# Patient Record
Sex: MALE
[De-identification: names, ages, dates, MRNs, and addresses within clinical notes are randomized per-mention and may not be internally consistent; named-entity substitution may affect disease eponyms.]

---

## 2017-01-27 ENCOUNTER — HOSPITAL ENCOUNTER (OUTPATIENT)
Dept: HOSPITAL 53 - M PAIN | Age: 47
End: 2017-01-27
Attending: NURSE PRACTITIONER
Payer: MEDICARE

## 2017-01-27 DIAGNOSIS — M47.816: ICD-10-CM

## 2017-01-27 DIAGNOSIS — G47.30: ICD-10-CM

## 2017-01-27 DIAGNOSIS — M25.512: ICD-10-CM

## 2017-01-27 DIAGNOSIS — M47.817: ICD-10-CM

## 2017-01-27 DIAGNOSIS — K21.9: ICD-10-CM

## 2017-01-27 DIAGNOSIS — Z79.899: ICD-10-CM

## 2017-01-27 DIAGNOSIS — M25.562: ICD-10-CM

## 2017-01-27 DIAGNOSIS — J45.909: ICD-10-CM

## 2017-01-27 DIAGNOSIS — E11.9: ICD-10-CM

## 2017-01-27 DIAGNOSIS — G89.29: ICD-10-CM

## 2017-01-27 DIAGNOSIS — H91.90: ICD-10-CM

## 2017-01-27 DIAGNOSIS — Z79.891: ICD-10-CM

## 2017-01-27 DIAGNOSIS — M17.12: ICD-10-CM

## 2017-01-27 DIAGNOSIS — Z09: Primary | ICD-10-CM

## 2017-01-28 NOTE — ECWPNPC
PATIENT NAME: TOREY TRUJILLO

: 1970

GENDER: MALE

MRN: [id number]

VISIT DATE: 2017

DISCHARGE DATE: 17 0945

VISIT LOCKED DATE TIME: 

PHYSICIAN: KIM HOLLAND 

RESOURCE: KIM HOLLAND 

 

           

           

REASON FOR APPOINTMENT

           

          1. BACK, R SHOULDER

           

HISTORY OF PRESENT ILLNESS

           

      TODAY'S VISIT:

      NOTES:

          RATES PAIN TODAY AS 6/10. DESCRIBES PAIN AS CONSTANT, ACHING,

          TENDER AND THROBBING. IS NOTING INCREASED PAIN IN LOW BACK WITH

          RADIATION TO BUTTUCKS AND DOWN HIPS. SAW [facility] FOR

          LEFT KNEE WHO DID INJECTION AND HAS BEEN GIVEN A BRACE TO

          SEPARATE THE LEG AND KNEE STRUCTURES. THEIR THOUGHT IS EVENTUAL

          KNEE REPLACEMENT. HAS NOT YET HEARD ON REFERRAL FOR SHOULDER TO

          DR THOMPSON. HAVING CONTINUED PAIN IN LEFT SHOULDER JOINT AND

          DIFFICULTY WITH ROM. .

           

      HISTORY OF PRESENT ILLNESS:

      PAIN

           

           

          THE PATIENT DESCRIBES THE PAIN...

           

      FALL RISK SCREENING:

      SCREENING

           

           

          :NO FALLS IN THE PAST YEAR

           

CURRENT MEDICATIONS

           

          TAKING ALBUTEROL SULFATE 2 PUFFS INHALATION EVERY 4 HOURS AS

          NEEDED

          TAKING ZYRTEC 10 MG 1 TAB ORAL DAILY

          TAKING CYMBALTA 30 MG 1 CAPSULE ORALLY DAILY

          TAKING FISH  MG CAPSULE ORALLY BID

          TAKING LISINOPRIL 10 10 MG TABLET ORAL DAILY

          TAKING METFORMIN 1500 MG 1 TAB ORAL DAILY

          TAKING MONTELUKAST SODIUM 10 MG TABLET ORALLY ONCE A DAY

          TAKING MULTIVITAMIN 1 TABLET ORALLY DAILY

          TAKING SIMVASTATIN 40 40MG TABLET ORAL DAILY

          TAKING VITAMIN D 2000 UNIT TABLET ORALLY DAILY

          TAKING SOMA 250 MG TABLET 1 TABLET AS NEEDED ORALLY BID

          TAKING NORCO  MG TABLET 1 TABLET AS NEEDED ORALLY EVERY 6

          HRS PRN PAIN MDD=4

          TAKING NUCYNTA  MG TABLET EXTENDED RELEASE 12 HOUR ORALLY

          AT BEDTIME

          NOT-TAKING PREDNISONE 10 MG TABLET 5 TABLET ORALLY TAKE 5 TABS X

          2 DAYS, 4 TAB X 2 DAY, 3 TAB X 2 DAY, 2 TAB X 2 DAY AND 1 TAB X 2

          DAYS

          NOT-TAKING NEXIUM 20 MG 1 TAB ORAL DAILY

          NOT-TAKING DICLOFENAC SODIUM 75 MG TABLET DELAYED RELEASE 1

          TABLET ORALLY BID

          NOT-TAKING INDOMETHACIN 50 MG CAPSULE 1 CAPSULE WITH FOOD OR MILK

          ORALLY TWICE A DAY TAKE WITH FOOD

          NOT-TAKING AMITRIPTYLINE HCL 25 MG TABLET 1 TABLET AT BEDTIME

          ORALLY ONCE A DAY

          NOT-TAKING COMBI RX CREAM 1 DOSE AS DIRECTED

          NOT-TAKING ASCORBIC ACID 600 MG CAPSULE ORALLY DAILY

          MEDICATION LIST REVIEWED AND RECONCILED WITH THE PATIENT

           

PAST MEDICAL HISTORY

           

          ASTHMA

          SLEEP APNEA

          HEARING LOSS

          DIABETES

          REFLUX

          L KNEE ARTHRITIS

           

ALLERGIES

           

          ENVIRONMENTAL: CONGESTION: ALLERGY

           

SOCIAL HISTORY

           

          GENERAL:

           

          TOBACCO USE

          ARE YOU A:NONSMOKER

           

           

          LEARNING BARRIERS / SPECIAL NEEDS ORIENTED TO PLAN OF CARE:

          PATIENT, PAIN MANAGEMENT PATIENT, ORIENTED TO PLAN OF CARE:

          PATIENT, PAIN MANAGEMENT PATIENT.

           

           

          NEW PATIENT PAIN DIARY

          TODAY'S VISITNOTES

          FROM 0-10, WHAT LEVEL IS YOUR PAIN TODAY?0

           

           

          PAIN CLINIC PFS, CLERGY, PUBLIC HEALTH REFERRALS

          PFS REFERRAL NEEDED?NO

          CLERGY REFERRAL NEEDED?NO

          PUBLIC HEALTH REFERRAL NEEDED?NO

          WAS THE PROVIDER NOTIFIED OF ANY PERTINENT INFO?NO

          PFS REFERRAL NEEDED?NO

          CLERGY REFERRAL NEEDED?NO

          PUBLIC HEALTH REFERRAL NEEDED?NO

          WAS THE PROVIDER NOTIFIED OF ANY PERTINENT INFO?NO

           

REVIEW OF SYSTEMS

           

      CONSTITUTIONAL:

           

          ANY CHANGE IN YOUR MEDICAL CONDITION? NO . CHILLS NO . FEVER NO .

           

      INFECTION:

           

          DO YOU HAVE NEW INFECTIONS? NO . DO YOU HAVE HISTORY OF MRSA? NO

          .

           

      MUSCULOSKELETAL:

           

          ANY NEW PATTERNS OF PAIN OR NUMBNESS? NO .

           

      GASTROENTEROLOGY:

           

          GENERAL HAS LOST 17 LBS WITH DIET AND EXERCERISE. . ANY NEW

          CHANGE IN BOWEL CONTROL? NO . ABDOMINAL PAIN GI UPSET WITH

          METFORMIN. .

           

      GENITOURINARY:

           

          ANY NEW CHANGE IN BLADDER CONTROL? NO . IS THERE A CHANCE YOU

          COULD BE PREGNANT? NO .

           

      HEMATOLOGY/LYMPH:

           

          DO YOU TAKE ANY BLOOD THINNERS? (FOR EXAMPLE- COUMADIN, PLAVIX,

          AGGRENOX, PLATEL, PRADAXA, OR XARELTO) NO . WHEN WAS YOUR LAST

          DOSE? DATE: TIME: .

           

      NEUROLOGY:

           

          HAVE YOU FALLEN IN THE PAST 6 MONTHS? NO . ANY NEW EXTREMITY

          NUMBNESS OR WEAKNESS? NO .

           

      CARDIOLOGY:

           

          DO YOU HAVE A PACEMAKER OR DEFIBRILLATOR? NO .

           

      RESPIRATORY:

           

          HAVE YOU BEEN SICK IN THE PAST WEEK? NO . FEVER NO . FLU LIKE

          SYMPTOMS? NO . COUGH NO .

           

      INTEGUMENTARY:

           

          DO YOU HAVE ANY RASHES OR OPEN SORES? NO - NO RECENT OUTBREAKS OF

          HIVES. SEES ALLERGIST SOON. .

           

      ALLERGIC/IMMUNO:

           

          ARE YOU ALLERGIC TO SHELLFISH OR IV DYE? NO . ANY NEW ALLERGIES?

          NO .

           

      PSYCHIATRIC:

           

          DO YOU HAVE THOUGHTS OF HURTING YOURSELF OR SOMEONE ELSE? NO .

          ARE YOU ABUSED, NEGLECTED, OR IN AN UNSAFE ENVIRONMENT? NO .

           

      ENDOCRINOLOGY:

           

          ARE YOU DIABETIC? YES DIET CONTROLLED - STOPPED METFORMIN .

           

      OTHER:

           

          DO YOU NEED ANY PRESCRIPTIONS? YES NEXIUM, ? PREDNISONE . IF YES,

          PLEASE LIST: ____ . ANY NEW PROBLEMS WITH YOUR MEDICATIONS? NO .

          WHEN DID YOU LAST EAT? ____ . WHEN DID YOU LAST DRINK? ____ .

          WHAT DID YOU LAST DRINK? ____ . NAME OF PERSON DRIVING YOU HOME?

          ____ . DO YOU HAVE ANY OTHER QUESTIONS OR CONCERNS NO .

           

      REVIEWED BY:

           

          PROVIDER: KIM RACHEL .

           

VITAL SIGNS

           

           LBS, HT 71 IN, BMI 30.40 INDEX, /93 MM HG, HR 70

          /MIN, RR 16 /MIN, TEMP 97.3 F, OXYGEN SAT % 99, NA INITIALS TL

          0848, REVIEWED BY: MLF.

           

EXAMINATION

           

      GENERAL EXAMINATION:

          PSYCHALERT , ORIENTED X 3 , APPROPRIATE MOOD AND AFFECT .

           

          LUNGS:CLEAR TO AUSCULTATION BILATERALLY, NO WHEEZES, RALES AND

          RHONCHI.

           

          HEART:HEART RATE REGULAR.

           

          MUSCULOSKELETAL:POINT TENDERNESS OVER AR PARAVERTEBRAL MUSCLES

          AND INTO THE SACRUM. RESTRICTION OF ROM IN THIS AREA,IN

          OVERLUMBOSACRAL SPINE. POSITIVE FOR PAIN OVER LUMBAR PARSPINAL

          MUSCLES. POINT TENDERNESS OVER LEFT ANTERIOR AND POSTERIOR AC

          JOINT. MARKED REDUCTION OF RANGE OF MOTION TO LEFT SHOULDER WITH

          ABDUCTION..

           

          JOINTS:LEFT , KNEE , PAIN ALONG LATERAL ASPECT AND ALONG LEFT

          ILIOTIBIAL BAND..

           

ASSESSMENTS

           

          SPONDYLOSIS WITHOUT MYELOPATHY OR RADICULOPATHY, LUMBAR REGION -

          M47.816 (PRIMARY)

           

          SPONDYLOSIS WITHOUT MYELOPATHY OR RADICULOPATHY, LUMBOSACRAL

          REGION - M47.817

           

          LEFT SHOULDER PAIN, UNSPECIFIED CHRONICITY - M25.512

           

          LEFT LATERAL KNEE PAIN - M25.562

           

TREATMENT

           

      SPONDYLOSIS WITHOUT MYELOPATHY OR RADICULOPATHY,

          LUMBAR REGION

          INJECTION FACET JOINT/NERVE LUMBAR/SACRALKIM HOLLAND 2017

          9:25:28 AM > BILATERAL THERAPEUTIC LUMBAR AJAY HOLLANDKIM M

          2017 5:48:43 PM > L4-5 AND L5-S1

          NOTES: REFILL NEXIUM. CONTINUE CURRENT MEDS AND EXERCISE ROUTINE.

          APPOINTMENT OBTAINED WITH DR THOMPSON.,FACET JOINT INJECTION: YOUR

          EXPERIENCE MATERIAL WAS PRINTED,FACET JOINT INJECTION MATERIAL

          WAS PRINTED.

          CLINICAL NOTES: ISTOP REGISTRY REVIEWED. PT'S MEDS ARE ALL FILLED

          ON [facility] AND ARE NOT ON ISTOP. BRINGS IN MEDICATIONS WHICH IS

          APPROPRIATE FOR WHAT WAS DISPENSED. RECENT URINE TOXICOLOGY

          REVIEWED. NO UNAUTHORIZED MEDICATIONS. NO ILLICIT SUBSTANCES AND

          PRESCRIBED MEDICATIONS WERE PRESENT., FALLS CARE PLAN: 1.

          RECOMMEND REMOVING ALL THROW RUGS. 2. RECOMMEND NIGHT LIGHTS 3.

          RECOMMEND WEARING RUBBER SOLED SHOES AND TO NOT GO BAREFOOT. 4.

          ADVISED TO USE ASSISTIVE DEVICE (KNEE BRACE) SUCH AS CANE OR

          WALKER AS NEEDED.

           

PROCEDURE CODES

           

           ESTABILISHED PATIENT [facility] FACILITY CHARGE

           

           BP SCR PRFRM RCMDD DEFIND SCR INTVL

           

           PAIN ASSESS POS TOOL F/U PLAN DOC

           

          3016F PT SCRND UNHLTHY OH USE

           

          1124F ACP DISCUSS-NO DSCNMKR DOCD

           

          1036F TOBACCO NON-USER

           

          0518F FALL PLAN OF CARE DOCD

           

           DOC MEDS VERIFIED W/PT OR RE

           

           BMI >=30 CALCUATE W/FOLLOWUP

           

          3288F FALL RISK ASSESSMENT DOCD

           

FOLLOW UP

           

          AFTER INJECTION (REASON: CHECK AUTH FOR BILATERAL THERAPEUTIC

          LUMBAR FACET BLOCK)

           

 

ELECTRONICALLY SIGNED BY TUNDE MERINO ON

          2017 AT 06:03 PM EST

           

           

           

 

DISCLAIMER :

THIS IS A VISIT SUMMARY EXTRACTED FROM THE [employer] CHART.

IT IS NOT A COPY OF THE [employer] PROGRESS NOTE.

MTDD

## 2017-05-30 ENCOUNTER — HOSPITAL ENCOUNTER (OUTPATIENT)
Dept: HOSPITAL 53 - M PAIN | Age: 47
End: 2017-05-30
Attending: NURSE PRACTITIONER
Payer: MEDICARE

## 2017-05-30 DIAGNOSIS — J30.9: ICD-10-CM

## 2017-05-30 DIAGNOSIS — Z79.891: ICD-10-CM

## 2017-05-30 DIAGNOSIS — M47.817: ICD-10-CM

## 2017-05-30 DIAGNOSIS — Z79.899: ICD-10-CM

## 2017-05-30 DIAGNOSIS — M47.816: Primary | ICD-10-CM

## 2017-05-30 DIAGNOSIS — Z79.84: ICD-10-CM

## 2017-05-30 DIAGNOSIS — M25.512: ICD-10-CM

## 2017-05-30 DIAGNOSIS — M25.562: ICD-10-CM

## 2017-06-18 NOTE — ECWPNPC
PATIENT NAME: TOREY TRUJILLO

: 1970

GENDER: MALE

MRN: [id number]

VISIT DATE: 2017

DISCHARGE DATE: 17 1622

VISIT LOCKED DATE TIME: 

PHYSICIAN: KIM HOLLAND 

RESOURCE: KIM HOLLAND 

 

           

           

REASON FOR APPOINTMENT

           

          1. KNEE, BACK, ANKLE

           

HISTORY OF PRESENT ILLNESS

           

      HISTORY OF PRESENT ILLNESS:

      PAIN

           

           

          THE PATIENT DESCRIBES THE PAIN...

           

      FALL RISK SCREENING:

      SCREENING

           

           

          :NO FALLS IN THE PAST YEAR

           

      TODAY'S VISIT:

      NOTES:

          RATES PAIN LEVEL TODAY AS 7/10. DESCRIBES PAIN AS ACHING AND

          THROBBING. WORST AREA IS LOW BACK AND KNEES. ALSO IS IS HAVING

          PAIN IN NECK AND SHOULDERS. HAS BEEN TOLD HE IS IN NEED OF TOTAL

          KNEEREPLACEMENT IN LEFT KNEE WHICH WAS HIS "GOOD KNEE". KNEE

          "LOCKS" UP WITH PROLONGED SITTING. HAS NEW N/T IN LEFT LEG FROM

          HIP TO HEEP. .

           

CURRENT MEDICATIONS

           

          TAKING ALBUTEROL SULFATE 2 PUFFS INHALATION EVERY 4 HOURS AS

          NEEDED

          TAKING ZYRTEC 10 MG 1 TAB ORAL DAILY

          TAKING CYMBALTA 30 MG 1 CAPSULE ORALLY DAILY

          TAKING FISH  MG CAPSULE ORALLY BID

          TAKING LISINOPRIL 10 10 MG TABLET ORAL DAILY

          TAKING METFORMIN 1500 MG 1 TAB ORAL DAILY

          TAKING MONTELUKAST SODIUM 10 MG TABLET ORALLY ONCE A DAY

          TAKING MULTIVITAMIN 1 TABLET ORALLY DAILY

          TAKING SIMVASTATIN 40 40MG TABLET ORAL DAILY

          TAKING VITAMIN D 2000 UNIT TABLET ORALLY DAILY

          TAKING SOMA 250 MG TABLET 1 TABLET AS NEEDED ORALLY BID

          TAKING NORCO  MG TABLET 1 TABLET AS NEEDED ORALLY EVERY 6

          HRS PRN PAIN MDD=4

          TAKING NUCYNTA  MG TABLET EXTENDED RELEASE 12 HOUR ORALLY

          AT BEDTIME

          TAKING NEXIUM 20 MG CAPSULE DELAYED RELEASE 1 CAPSULE ORALLY ONCE

          A DAY

          NOT-TAKING PREDNISONE 10 MG TABLET 5 TABLET ORALLY TAKE 5 TABS X

          2 DAYS, 4 TAB X 2 DAY, 3 TAB X 2 DAY, 2 TAB X 2 DAY AND 1 TAB X 2

          DAYS

          NOT-TAKING NEXIUM 20 MG 1 TAB ORAL DAILY

          NOT-TAKING DICLOFENAC SODIUM 75 MG TABLET DELAYED RELEASE 1

          TABLET ORALLY BID

          NOT-TAKING INDOMETHACIN 50 MG CAPSULE 1 CAPSULE WITH FOOD OR MILK

          ORALLY TWICE A DAY TAKE WITH FOOD

          NOT-TAKING AMITRIPTYLINE HCL 25 MG TABLET 1 TABLET AT BEDTIME

          ORALLY ONCE A DAY

          NOT-TAKING COMBI RX CREAM 1 DOSE AS DIRECTED

          NOT-TAKING ASCORBIC ACID 600 MG CAPSULE ORALLY DAILY

          MEDICATION LIST REVIEWED AND RECONCILED WITH THE PATIENT

           

PAST MEDICAL HISTORY

           

          ASTHMA

          SLEEP APNEA

          HEARING LOSS

          DIABETES

          REFLUX

          L KNEE ARTHRITIS

           

ALLERGIES

           

          ENVIRONMENTAL: CONGESTION: ALLERGY

           

SOCIAL HISTORY

           

          GENERAL:

           

          TOBACCO USE

          ARE YOU A:NONSMOKER

           

           

          LEARNING BARRIERS / SPECIAL NEEDS ORIENTED TO PLAN OF CARE:

          PATIENT, PAIN MANAGEMENT PATIENT, ORIENTED TO PLAN OF CARE:

          PATIENT, PAIN MANAGEMENT PATIENT.

           

           

          NEW PATIENT PAIN DIARY

          TODAY'S VISITNOTES

          FROM 0-10, WHAT LEVEL IS YOUR PAIN TODAY?0

           

           

          PAIN CLINIC PFS, CLERGY, PUBLIC HEALTH REFERRALS

          PFS REFERRAL NEEDED?NO

          CLERGY REFERRAL NEEDED?NO

          PUBLIC HEALTH REFERRAL NEEDED?NO

          WAS THE PROVIDER NOTIFIED OF ANY PERTINENT INFO?NO

          PFS REFERRAL NEEDED?NO

          CLERGY REFERRAL NEEDED?NO

          PUBLIC HEALTH REFERRAL NEEDED?NO

          WAS THE PROVIDER NOTIFIED OF ANY PERTINENT INFO?NO

           

REVIEW OF SYSTEMS

           

      CONSTITUTIONAL:

           

          ANY CHANGE IN YOUR MEDICAL CONDITION? NO . CHILLS NO . FEVER NO .

           

      INFECTION:

           

          DO YOU HAVE NEW INFECTIONS? NO . DO YOU HAVE HISTORY OF MRSA? NO

          .

           

      MUSCULOSKELETAL:

           

          ANY NEW PATTERNS OF PAIN OR NUMBNESS? NO .

           

      GASTROENTEROLOGY:

           

          ANY NEW CHANGE IN BOWEL CONTROL? NO .

           

      GENITOURINARY:

           

          ANY NEW CHANGE IN BLADDER CONTROL? NO . IS THERE A CHANCE YOU

          COULD BE PREGNANT? NO .

           

      HEMATOLOGY/LYMPH:

           

          DO YOU TAKE ANY BLOOD THINNERS? (FOR EXAMPLE- COUMADIN, PLAVIX,

          AGGRENOX, PLATEL, PRADAXA, OR XARELTO) NO . WHEN WAS YOUR LAST

          DOSE? DATE: TIME: .

           

      NEUROLOGY:

           

          HAVE YOU FALLEN IN THE PAST 6 MONTHS? NO . ANY NEW EXTREMITY

          NUMBNESS OR WEAKNESS? NO .

           

      CARDIOLOGY:

           

          DO YOU HAVE A PACEMAKER OR DEFIBRILLATOR? NO .

           

      RESPIRATORY:

           

          HAVE YOU BEEN SICK IN THE PAST WEEK? NO . FEVER NO . FLU LIKE

          SYMPTOMS? NO . COUGH NO .

           

      INTEGUMENTARY:

           

          DO YOU HAVE ANY RASHES OR OPEN SORES? NO .

           

      ALLERGIC/IMMUNO:

           

          ARE YOU ALLERGIC TO SHELLFISH OR IV DYE? NO . ANY NEW ALLERGIES?

          NO .

           

      PSYCHIATRIC:

           

          DO YOU HAVE THOUGHTS OF HURTING YOURSELF OR SOMEONE ELSE? NO .

          ARE YOU ABUSED, NEGLECTED, OR IN AN UNSAFE ENVIRONMENT? NO .

           

      ENDOCRINOLOGY:

           

          ARE YOU DIABETIC? YES ON DIET CONTROL LAST AIC 4.6 .

           

      OTHER:

           

          DO YOU NEED ANY PRESCRIPTIONS? YES . IF YES, PLEASE LIST:

          CYMBALTA--GETS MEDS AT [facility] . ANY NEW PROBLEMS WITH YOUR

          MEDICATIONS? NO . WHEN DID YOU LAST EAT? ____ . WHEN DID YOU LAST

          DRINK? ____ . WHAT DID YOU LAST DRINK? ____ . NAME OF PERSON

          DRIVING YOU HOME? ____ . DO YOU HAVE ANY OTHER QUESTIONS OR

          CONCERNS NO .

           

      SKIN:

           

          PATIENT COMPLAINING OF RETURN OF HIVES RECENTLY - USING BENEDRYL

          .

           

      REVIEWED BY:

           

          PROVIDER: KIM RACHEL .

           

VITAL SIGNS

           

          .6 LBS, HT 71 IN, BMI 34.25 INDEX, /93 MM HG, HR 76

          /MIN, RR 16 /MIN, TEMP 98.0 F, OXYGEN SAT % 96%, NA INITIALS SC

          15:26, REVIEWED BY: AD.

           

EXAMINATION

           

      GENERAL EXAMINATION:

          PSYCHALERT , ORIENTED X 3 , APPROPRIATE MOOD AND AFFECT .

           

          LUNGS:CLEAR TO AUSCULTATION BILATERALLY, NO WHEEZES, RALES AND

          RHONCHI.

           

          HEART:HEART RATE REGULAR.

           

          MUSCULOSKELETAL:POINT TENDERNESS OVER AR PARAVERTEBRAL MUSCLES

          AND INTO THE SACRUM. RESTRICTION OF ROM IN THIS AREA,IN

          OVERLUMBOSACRAL SPINE. POSITIVE FOR PAIN OVER LUMBAR PARSPINAL

          MUSCLES. POINT TENDERNESS OVER LEFT ANTERIOR AND POSTERIOR AC

          JOINT. MARKED REDUCTION OF RANGE OF MOTION TO LEFT SHOULDER WITH

          ABDUCTION..

           

          JOINTS:LEFT , KNEE , PAIN ALONG LATERAL ASPECT AND ALONG LEFT

          ILIOTIBIAL BAND..

           

ASSESSMENTS

           

          SPONDYLOSIS WITHOUT MYELOPATHY OR RADICULOPATHY, LUMBAR REGION -

          M47.816 (PRIMARY)

           

          SPONDYLOSIS WITHOUT MYELOPATHY OR RADICULOPATHY, LUMBOSACRAL

          REGION - M47.817

           

          LEFT SHOULDER PAIN, UNSPECIFIED CHRONICITY - M25.512

           

          LEFT LATERAL KNEE PAIN - M25.562

           

TREATMENT

           

      SPONDYLOSIS WITHOUT MYELOPATHY OR RADICULOPATHY,

          LUMBAR REGION

          START DULOXETINE HCL CAPSULE DELAYED RELEASE PARTICLES, 30 MG, 1

          CAPSULE, ORALLY, DAILY, 30 DAY(S), 30 CAPSULE, REFILLS 5

          NOTES: REFER TO DR MALONE FOR MEDICAL MARIJUANA PROGRAM.

          CLINICAL NOTES: FALLS CARE PLAN: 1. RECOMMEND REMOVING ALL THROW

          RUGS. 2. RECOMMEND NIGHT LIGHTS 3. RECOMMEND WEARING RUBBER SOLED

          SHOES AND TO NOT GO BAREFOOT. 4.. ADVISED TO CHANGE POSITION

          SLOWLY FROM SUPINE TO STANDING TO AVOID DIZZINESS. 5. ADVISED TO

          USE ASSISTIVE DEVICE SUCH AS CANE WHEN NEEDED. USE KNEE SUPPORT

          AS NEEDED.

           

PROCEDURE CODES

           

           ESTABILISHED PATIENT [facility] FACILITY CHARGE

           

           BP SCR PRFRM RCMDD DEFIND SCR INTVL

           

           PAIN ASSESS POS TOOL F/U PLAN DOC

           

          3016F PT SCRND UNHLTHY OH USE

           

          1124F ACP DISCUSS-NO DSCNMKR DOCD

           

          1036F TOBACCO NON-USER

           

          0518F FALL PLAN OF CARE DOCD

           

           DOC MEDS VERIFIED W/PT OR RE

           

           BMI<30 AND >=22 CALC & DOCU

           

          3288F FALL RISK ASSESSMENT DOCD

           

DISPOSITION & COMMUNICATION

           

FOLLOW UP

           

          4-6 WEEKS (REASON: JOINT PAIN/KNEE PAIN)

           

 

ELECTRONICALLY SIGNED BY TUNDE MERINO ON

          2017 AT 01:34 PM EDT

           

           

           

 

DISCLAIMER :

THIS IS A VISIT SUMMARY EXTRACTED FROM THE [employer] CHART.

IT IS NOT A COPY OF THE [employer] PROGRESS NOTE.

MTDD

## 2018-07-23 ENCOUNTER — HOSPITAL ENCOUNTER (OUTPATIENT)
Dept: HOSPITAL 53 - M PAIN | Age: 48
End: 2018-07-23
Attending: NURSE PRACTITIONER
Payer: MEDICARE

## 2018-07-23 DIAGNOSIS — J45.909: ICD-10-CM

## 2018-07-23 DIAGNOSIS — M50.93: ICD-10-CM

## 2018-07-23 DIAGNOSIS — M17.12: ICD-10-CM

## 2018-07-23 DIAGNOSIS — M54.16: ICD-10-CM

## 2018-07-23 DIAGNOSIS — M79.1: Primary | ICD-10-CM

## 2018-07-23 DIAGNOSIS — G47.30: ICD-10-CM

## 2018-07-23 DIAGNOSIS — E11.9: ICD-10-CM

## 2018-07-23 DIAGNOSIS — Z79.899: ICD-10-CM

## 2018-08-14 ENCOUNTER — HOSPITAL ENCOUNTER (OUTPATIENT)
Dept: HOSPITAL 53 - M PAIN | Age: 48
End: 2018-08-14
Attending: ANESTHESIOLOGY
Payer: MEDICARE

## 2018-08-14 DIAGNOSIS — Z79.899: ICD-10-CM

## 2018-08-14 DIAGNOSIS — E11.9: ICD-10-CM

## 2018-08-14 DIAGNOSIS — M17.12: ICD-10-CM

## 2018-08-14 DIAGNOSIS — G47.30: ICD-10-CM

## 2018-08-14 DIAGNOSIS — J45.909: ICD-10-CM

## 2018-08-14 DIAGNOSIS — M54.6: ICD-10-CM

## 2018-08-14 DIAGNOSIS — M54.5: ICD-10-CM

## 2018-08-14 DIAGNOSIS — M79.1: ICD-10-CM

## 2018-08-14 DIAGNOSIS — G89.29: Primary | ICD-10-CM

## 2018-08-23 ENCOUNTER — HOSPITAL ENCOUNTER (OUTPATIENT)
Dept: HOSPITAL 53 - M PAIN | Age: 48
End: 2018-08-23
Attending: NURSE PRACTITIONER
Payer: MEDICARE

## 2018-08-23 DIAGNOSIS — G47.30: ICD-10-CM

## 2018-08-23 DIAGNOSIS — M50.93: ICD-10-CM

## 2018-08-23 DIAGNOSIS — J45.909: ICD-10-CM

## 2018-08-23 DIAGNOSIS — M17.12: ICD-10-CM

## 2018-08-23 DIAGNOSIS — M79.1: Primary | ICD-10-CM

## 2018-08-23 DIAGNOSIS — E11.9: ICD-10-CM

## 2018-08-23 DIAGNOSIS — K21.9: ICD-10-CM

## 2018-08-23 DIAGNOSIS — M54.16: ICD-10-CM

## 2018-08-23 DIAGNOSIS — Z79.899: ICD-10-CM

## 2018-09-17 ENCOUNTER — HOSPITAL ENCOUNTER (OUTPATIENT)
Dept: HOSPITAL 53 - M PAIN | Age: 48
End: 2018-09-17
Attending: ANESTHESIOLOGY
Payer: MEDICARE

## 2018-09-17 DIAGNOSIS — J45.909: ICD-10-CM

## 2018-09-17 DIAGNOSIS — M79.1: Primary | ICD-10-CM

## 2018-09-17 DIAGNOSIS — M17.12: ICD-10-CM

## 2018-09-17 DIAGNOSIS — Z79.899: ICD-10-CM

## 2018-09-17 DIAGNOSIS — E11.9: ICD-10-CM

## 2018-09-17 DIAGNOSIS — G47.30: ICD-10-CM

## 2018-09-17 DIAGNOSIS — K21.9: ICD-10-CM

## 2018-10-15 ENCOUNTER — HOSPITAL ENCOUNTER (OUTPATIENT)
Dept: HOSPITAL 53 - M PAIN | Age: 48
End: 2018-10-15
Attending: NURSE PRACTITIONER
Payer: MEDICARE

## 2018-10-15 DIAGNOSIS — M17.12: ICD-10-CM

## 2018-10-15 DIAGNOSIS — E11.9: ICD-10-CM

## 2018-10-15 DIAGNOSIS — M54.16: ICD-10-CM

## 2018-10-15 DIAGNOSIS — J45.909: ICD-10-CM

## 2018-10-15 DIAGNOSIS — Z79.899: ICD-10-CM

## 2018-10-15 DIAGNOSIS — G47.30: ICD-10-CM

## 2018-10-15 DIAGNOSIS — M79.7: Primary | ICD-10-CM

## 2018-10-15 DIAGNOSIS — M50.93: ICD-10-CM

## 2018-10-17 ENCOUNTER — HOSPITAL ENCOUNTER (OUTPATIENT)
Dept: HOSPITAL 53 - M PAIN | Age: 48
End: 2018-10-17
Attending: ANESTHESIOLOGY
Payer: MEDICARE

## 2018-10-17 DIAGNOSIS — G47.30: ICD-10-CM

## 2018-10-17 DIAGNOSIS — K21.9: ICD-10-CM

## 2018-10-17 DIAGNOSIS — J45.909: ICD-10-CM

## 2018-10-17 DIAGNOSIS — M17.12: ICD-10-CM

## 2018-10-17 DIAGNOSIS — M79.18: Primary | ICD-10-CM

## 2018-10-17 DIAGNOSIS — Z79.899: ICD-10-CM

## 2018-10-17 DIAGNOSIS — M54.5: ICD-10-CM

## 2018-10-17 DIAGNOSIS — E11.9: ICD-10-CM

## 2018-10-31 ENCOUNTER — HOSPITAL ENCOUNTER (OUTPATIENT)
Dept: HOSPITAL 53 - M PAIN | Age: 48
End: 2018-10-31
Attending: NURSE PRACTITIONER
Payer: MEDICARE

## 2018-10-31 DIAGNOSIS — M79.7: Primary | ICD-10-CM

## 2018-10-31 DIAGNOSIS — E11.9: ICD-10-CM

## 2018-10-31 DIAGNOSIS — Z79.899: ICD-10-CM

## 2018-10-31 DIAGNOSIS — J45.909: ICD-10-CM

## 2018-10-31 DIAGNOSIS — M50.93: ICD-10-CM

## 2018-10-31 DIAGNOSIS — M17.12: ICD-10-CM

## 2018-10-31 DIAGNOSIS — M54.16: ICD-10-CM

## 2018-10-31 DIAGNOSIS — G47.30: ICD-10-CM

## 2018-10-31 DIAGNOSIS — Z91.09: ICD-10-CM

## 2018-12-27 ENCOUNTER — HOSPITAL ENCOUNTER (OUTPATIENT)
Dept: HOSPITAL 53 - M PAIN | Age: 48
End: 2018-12-27
Attending: NURSE PRACTITIONER
Payer: MEDICARE

## 2018-12-27 DIAGNOSIS — Z79.891: ICD-10-CM

## 2018-12-27 DIAGNOSIS — Z79.899: ICD-10-CM

## 2018-12-27 DIAGNOSIS — M79.18: Primary | ICD-10-CM

## 2018-12-27 DIAGNOSIS — M79.7: ICD-10-CM

## 2018-12-27 DIAGNOSIS — J30.2: ICD-10-CM

## 2018-12-27 DIAGNOSIS — Z79.82: ICD-10-CM

## 2019-01-17 ENCOUNTER — HOSPITAL ENCOUNTER (OUTPATIENT)
Dept: HOSPITAL 53 - M PAIN | Age: 49
End: 2019-01-17
Attending: ANESTHESIOLOGY
Payer: MEDICARE

## 2019-01-17 DIAGNOSIS — J45.909: ICD-10-CM

## 2019-01-17 DIAGNOSIS — Z79.899: ICD-10-CM

## 2019-01-17 DIAGNOSIS — E11.9: ICD-10-CM

## 2019-01-17 DIAGNOSIS — M79.18: Primary | ICD-10-CM

## 2019-01-17 DIAGNOSIS — G47.30: ICD-10-CM

## 2019-01-17 DIAGNOSIS — M54.5: ICD-10-CM

## 2019-01-17 DIAGNOSIS — M17.12: ICD-10-CM

## 2019-01-17 PROCEDURE — 20552 NJX 1/MLT TRIGGER POINT 1/2: CPT

## 2019-01-21 NOTE — ECWPNPC
PATIENT NAME: TOREY TRUJILLO

: 1970

GENDER: MALE

MRN: [id number]

VISIT DATE: 2018

DISCHARGE DATE: 18 1405

VISIT LOCKED DATE TIME: 

PHYSICIAN: JELANI TRAN

RESOURCE: JELANI TRAN

 

           

           

REASON FOR APPOINTMENT

           

          1. 8 WEEKS

           

HISTORY OF PRESENT ILLNESS

           

      HISTORY OF PRESENT ILLNESS:  HERE FOR F/U AND

          MANAGEMENT FOR CHRONIC GENERALIZED BACK PAIN.CHIEF AREA OF PAIN

          IS MID AND LOW BACK.HAS RESPONDED WELL TO TPI IN THE PAST.RATING

          PAIN VAS 6/10.FINDS CURRENT CHRONIC PAIN MEDICINE EFFECTIVE AT

          REDUCING PAIN.DENIES SIDE EFFECTS.

      PAIN

           

           

          THE PATIENT DESCRIBES THE PAIN...

           

      FALL RISK SCREENING:

      SCREENING

           

           

          :NO FALLS IN THE PAST YEAR

           

CURRENT MEDICATIONS

           

          TAKING ALBUTEROL SULFATE 2 PUFFS INHALATION EVERY 4 HOURS AS

          NEEDED

          TAKING MULTIVITAMIN 1 TABLET ORALLY DAILY

          TAKING ASCORBIC ACID 600 MG CAPSULE ORALLY DAILY

          TAKING MAY HAVE - - CBD OIL PO AS DIRECTED

          TAKING LISINOPRIL 10 10 MG TABLET ORAL DAILY

          TAKING FISH  MG CAPSULE ORALLY BID

          TAKING VITAMIN D 2000 UNIT TABLET ORALLY DAILY

          TAKING LIDODERM 5 % PATCH 1-2 PATCH TO SKIN REMOVE AFTER 12 HOURS

          EXTERNALLY TO PAIN FULAREAS OF BACK, KNEE/SHOULDER AS NEEDED ON

          12, OFF 12

          TAKING HYDROCODONE-ACETAMINOPHEN  MG TABLET 1 TABLET AS

          NEEDED ORALLY BID PRN PAIN MDD=2

          TAKING BACLOFEN 10 MG TABLET 1 TABLET WITH FOOD OR MILK ORALLY

          THREE TIMES A DAY

          TAKING INDOMETHACIN 50 MG CAPSULE 1 CAPSULE WITH FOOD OR MILK

          ORALLY TWICE A DAY

          NOT-TAKING INDOMETHACIN 50 MG CAPSULE 1 CAPSULE WITH FOOD OR MILK

          ORALLY TWICE A DAY TAKE WITH FOOD

          NOT-TAKING SIMVASTATIN 40 40MG TABLET ORAL DAILY

          NOT-TAKING NEXIUM 20 MG CAPSULE DELAYED RELEASE 1 CAPSULE ORALLY

          ONCE A DAY

          NOT-TAKING OMEPRAZOLE 20 MG CAPSULE DELAYED RELEASE 1 CAPSULE

          ORALLY ONCE A DAY

          NOT-TAKING METFORMIN 1500 MG 1 TAB ORAL DAILY

          NOT-TAKING CYMBALTA 30 MG 1 CAPSULE ORALLY DAILY

          NOT-TAKING DULOXETINE HCL 30 MG CAPSULE DELAYED RELEASE PARTICLES

          1 CAPSULE ORALLY DAILY

          NOT-TAKING ZYRTEC 10 MG 1 TAB ORAL DAILY

          NOT-TAKING MONTELUKAST SODIUM 10 MG TABLET ORALLY ONCE A DAY

          NOT-TAKING SOMA 250 MG TABLET 1 TABLET AS NEEDED ORALLY BID

          NOT-TAKING NORCO  MG TABLET 1 TABLET AS NEEDED ORALLY EVERY

          6 HRS PRN PAIN MDD=4

          NOT-TAKING NUCYNTA  MG TABLET EXTENDED RELEASE 12 HOUR

          ORALLY AT BEDTIME

          NOT-TAKING LIDODERM 5 % PATCH 1 PATCH TO SKIN REMOVE AFTER 12

          HOURS EXTERNALLY APPLY TO LOW BACK AND KNEE ONCE A DAY ON 12 HRS

          OFF 12 HOURS

          NOT-TAKING DICLOFENAC SODIUM 75 MG TABLET DELAYED RELEASE 1

          TABLET ORALLY BID

          NOT-TAKING PREDNISONE 10 MG TABLET 5 TABLET ORALLY TAKE 5 TABS X

          2 DAYS, 4 TAB X 2 DAY, 3 TAB X 2 DAY, 2 TAB X 2 DAY AND 1 TAB X 2

          DAYS

          NOT-TAKING NEXIUM 20 MG 1 TAB ORAL DAILY

          NOT-TAKING AMITRIPTYLINE HCL 25 MG TABLET 1 TABLET AT BEDTIME

          ORALLY ONCE A DAY

          NOT-TAKING COMBI RX CREAM 1 DOSE AS DIRECTED

          MEDICATION LIST REVIEWED AND RECONCILED WITH THE PATIENT

           

PAST MEDICAL HISTORY

           

          ASTHMA

          SLEEP APNEA, USES CPAP

          HEARING LOSS

          DIABETES

          REFLUX

          L KNEE ARTHRITIS

           

ALLERGIES

           

          ENVIRONMENTAL: CONGESTION: ALLERGY

           

SURGICAL HISTORY

           

          RIGHT SHOULDER RECONSTRUCTION/ ROTATOR CUFF REPAIR 

          LEFT ANKLE RECONSTRUCTION 

          RIGHT KNEE MENISCUS REPAIR 

          UP3 ( ORAL SURGERY) 

          RIGHT SHOULDER REPAIR 

           

FAMILY HISTORY

           

          FATHER:  68 YRS, DIAGNOSED WITH HEART DISEASE

          MOTHER: ALIVE 64 YRS

          3 SISTER(S) - HEALTHY. 2 SON(S) - HEALTHY.

           

SOCIAL HISTORY

           

          GENERAL:

           

          TOBACCO USE

          ARE YOU A:NONSMOKER

           

           

          ALCOHOL SCREENING

          DID YOU HAVE A DRINK CONTAINING ALCOHOL IN THE PAST YEAR?YES

          POINTS1

          INTERPRETATIONNEGATIVE

          HOW OFTEN DID YOU HAVE A DRINK CONTAINING ALCOHOL IN THE PAST

          YEAR?MONTHLY OR LESS (1 POINT)

           

           

          Restorationist

          [id number] NONE

           

           

          LEARNING BARRIERS / SPECIAL NEEDS

          BARRIERS TO LEARNING?NO

          HEARING IMPAIRED?NO

          VISION IMPAIRED?YES

          :CORRECTIVE LENSES READING

          COGNITIVELY IMPAIRED?NO

          READINESS TO LEARN?YES

          LEARNING PREFERENCES?NO

          LEARNING CAPABILITIES PRESENT?YES

          EMOTIONAL BARRIERS?NO

          SPECIAL DEVICES?YES

          :CANE, OTHER KNEE AND ANKLE BRACE

           NEEDED?NO

           

           

          DIET: CARBOHYDRATE CONTROLLED.

           

           

          MARITAL STATUS: .

           

           

          OTHERS AT HOME: CHILD 2 BOYS.

           

           

          NEW PATIENT PAIN DIARY  TODAY'S VISIT NOTES, FROM 0-10, WHAT

          LEVEL IS YOUR PAIN TODAY? 0.

           

           

          PAIN CLINIC PFS, CLERGY, PUBLIC HEALTH REFERRALS

          PFS REFERRAL NEEDED?NO

          CLERGY REFERRAL NEEDED?NO

          PUBLIC HEALTH REFERRAL NEEDED?NO

          WAS THE PROVIDER NOTIFIED OF ANY PERTINENT INFO?YES

          HAS THE PATIENT BEEN EDUCATED REGARDING HIS/HER PLAN OF CARE?YES

          HAS THE PATIENT BEEN EDUCATED REGARDING PAIN, THE RISK FOR PAIN,

          THE IMPORTANCE OF EFFECTIVE PAIN MANAGEMENT, AND THE PAIN

          ASSESSMENT PROCESS?YES

           

           

          ADVANCE DIRECTIVE

          ADVANCE DIRECTIVE DISCUSSED WITH PATIENT:YES PT HCP ALVARO TRUJILLO [phone]

           

           

          REVIEWED WITH PT 18 1325 LAS.

           

HOSPITALIZATION/MAJOR DIAGNOSTIC PROCEDURE

           

          [facility] 2013

           

REVIEW OF SYSTEMS

           

      REVIEWED BY:

           

          PROVIDER:    EJLANI RACHEL .

           

      CONSTITUTIONAL:

           

          ANY CHANGE IN YOUR MEDICAL CONDITION?    NO . CHILLS    NO .

          FEVER    NO .

           

      INFECTION:

           

          DO YOU HAVE NEW INFECTIONS?    NO . DO YOU HAVE HISTORY OF MRSA? 

            NO .

           

      MUSCULOSKELETAL:

           

          ANY NEW PATTERNS OF PAIN OR NUMBNESS?    NO .

           

      GASTROENTEROLOGY:

           

          ANY NEW CHANGE IN BOWEL CONTROL?    NO .

           

      GENITOURINARY:

           

          ANY NEW CHANGE IN BLADDER CONTROL?    NO . IS THERE A CHANCE YOU

          COULD BE PREGNANT?    NO .

           

      HEMATOLOGY/LYMPH:

           

          DO YOU TAKE ANY BLOOD THINNERS? (FOR EXAMPLE- COUMADIN, PLAVIX,

          AGGRENOX, PLATEL, PRADAXA, OR XARELTO)    NO . WHEN WAS YOUR LAST

          DOSE?    DATE: TIME:  .

           

      NEUROLOGY:

           

          HAVE YOU FALLEN IN THE PAST 6 MONTHS?    NO . ANY NEW EXTREMITY

          NUMBNESS OR WEAKNESS?    NO .

           

      CARDIOLOGY:

           

          DO YOU HAVE A PACEMAKER OR DEFIBRILLATOR?    NO .

           

      RESPIRATORY:

           

          HAVE YOU BEEN SICK IN THE PAST WEEK?    NO . FEVER    NO . FLU

          LIKE SYMPTOMS?    NO . COUGH    NO .

           

      INTEGUMENTARY:

           

          DO YOU HAVE ANY RASHES OR OPEN SORES?    NO .

           

      ALLERGIC/IMMUNO:

           

          ARE YOU ALLERGIC TO SHELLFISH OR IV DYE?    NO . ANY NEW

          ALLERGIES?    NO .

           

      PSYCHIATRIC:

           

          DO YOU HAVE THOUGHTS OF HURTING YOURSELF OR SOMEONE ELSE?    NO .

          ARE YOU ABUSED, NEGLECTED, OR IN AN UNSAFE ENVIRONMENT?    NO .

           

      ENDOCRINOLOGY:

           

          ARE YOU DIABETIC?    YES .

           

      OTHER:

           

          DO YOU NEED ANY PRESCRIPTIONS?    YES . IF YES, PLEASE LIST:   

          ____HYDROCODONE, LIDODERM PATCHES . ANY NEW PROBLEMS WITH YOUR

          MEDICATIONS?    NO . WHEN DID YOU LAST EAT?    ____ . WHEN DID

          YOU LAST DRINK?    ____ . WHAT DID YOU LAST DRINK?    ____ . NAME

          OF PERSON DRIVING YOU HOME?    ____ . DO YOU HAVE ANY OTHER

          QUESTIONS OR CONCERNS    NO .

           

VITAL SIGNS

           

          .6 LBS, HT 71 IN, BMI 30.49 INDEX, /74 MM HG, HR 76

          /MIN, RR 16 /MIN, TEMP 97.3 F, OXYGEN SAT % 99%, SAFE IN ENV?

          (Y/N) YES, NA INITIALS SC 13:13, REVIEWED BY: QUIN.

           

EXAMINATION

           

      GENERAL EXAMINATION:

          GENERAL APPEARANCE:AWAKE,ALERT ,PLEAASANT .

           

          PSYCHAFFECT NORMAL .

           

          LUNGS:LUNG TEJEDA ARE CLEAR TO AUSCULTATION BILATERALLY. GOOD

          MOVEMENT OF AIR .

           

          HEART:S1, S2 IN A REGULAR RATE AND RHYTHM. NO SIGNIFICANT

          MURMURS, RUBS OR GALLOPS NOTED .

           

          MUSCULOSKELETAL:MUSCLE STRENGTH TESTING 5/5 BILATERAL LOWER

          EXTREMITIES .

           

          LUMBAR SACRAL SPINETRIGGER POINTS:, ELICITED WITH PALPATION OVER

          LUMBAR PARAVERTEBRAL MUSCLES AND THORACIC PARASPINALSRESTRICTION

          OF ROM IN THIS AREA .

           

ASSESSMENTS

           

          MYALGIA, OTHER SITE - M79.18 (PRIMARY)

           

          FIBROMYALGIA - M79.7

           

TREATMENT

           

      MYALGIA, OTHER SITE

          CONTINUE HYDROCODONE-ACETAMINOPHEN TABLET,  MG, 1 TABLET AS

          NEEDED, ORALLY, BID PRN PAIN MDD=2

          STOP INDOMETHACIN CAPSULE, 50 MG, 1 CAPSULE WITH FOOD OR MILK,

          ORALLY, TWICE A DAY

          CONTINUE BACLOFEN TABLET, 10 MG, 1 TABLET WITH FOOD OR MILK,

          ORALLY, THREE TIMES A DAY

          START ARTHROTEC TABLET DELAYED RELEASE, 75-0.2 MG, 1 TABLET WITH

          FOOD, ORALLY, TWICE A DAY, 30 DAY(S), 60, REFILLS 2

          REFILL LIDODERM PATCH, 5 %, 1 PATCH TO SKIN REMOVE AFTER 12

          HOURS, EXTERNALLY, APPLY TO LOW BACK AND KNEE ONCE A DAY ON 12

          HRS OFF 12 HOURS, 30 DAY(S), 60, REFILLS 2

          NOTES: TPI THORACIC /LOW BACK, ISTOP REGISTRY REVIEWED AND

          DEMONSTRATES COMPLLIANCE. (REF #[id number] ) BRINGS IN MEDICATIONS

          WHICH IS APPROPRIATE FOR WHAT WAS DISPENSED. RECENT URINE

          TOXICOLOGY REVIEWED. NO UNAUTHORIZED MEDICATIONS. NO ILLICIT

          SUBSTANCES AND PRESCRIBED MEDICATIONS WERE PRESENT. , RISKS AND

          BENEFITS OF NARCOTIC/OPIOD MEDICATIONS WERE REVIEWED WITH PATIENT

          - THIS INCLUDES BUT IS NOT LIMITED TO RISK OF

          DEPENDANCE/DEVELOPMENT OF ADDICTION, MOOD DISTURBANCE AND

          DEPRESSION, OSTEOPOROSIS, HORMONAL AND LABIDAL CHANGES,

          RESPIRATORY DEPRESSION AND DEATH. PATIENT IS ADVISED NOT TO DRIVE

          OR DRINK ALCOHOL WHILE ON THESE MEDICATIONS.

           

PROCEDURE CODES

           

           ESTABILISHED PATIENT [facility] CHARGE

           

DISPOSITION & COMMUNICATION

           

FOLLOW UP

           

          POST (REASON: TPI THORACIC /LOW BACK)

           

 

ELECTRONICALLY SIGNED BY VIRGINIE SMITH ON

          2019 AT 04:57 PM EST

           

           

           

 

DISCLAIMER :

THIS IS A VISIT SUMMARY EXTRACTED FROM THE [employer] CHART.

IT IS NOT A COPY OF THE [employer] PROGRESS NOTE.

BRID

## 2019-01-25 ENCOUNTER — HOSPITAL ENCOUNTER (EMERGENCY)
Dept: HOSPITAL 53 - M ED | Age: 49
Discharge: HOME | End: 2019-01-25
Payer: MEDICARE

## 2019-01-25 VITALS
BODY MASS INDEX: 27.82 KG/M2 | SYSTOLIC BLOOD PRESSURE: 138 MMHG | WEIGHT: 205.43 LBS | DIASTOLIC BLOOD PRESSURE: 93 MMHG | HEIGHT: 72 IN

## 2019-01-25 DIAGNOSIS — Z79.899: ICD-10-CM

## 2019-01-25 DIAGNOSIS — J45.909: ICD-10-CM

## 2019-01-25 DIAGNOSIS — F43.10: ICD-10-CM

## 2019-01-25 DIAGNOSIS — L50.9: Primary | ICD-10-CM

## 2019-01-25 DIAGNOSIS — G47.33: ICD-10-CM

## 2019-01-25 DIAGNOSIS — E11.9: ICD-10-CM

## 2019-01-25 PROCEDURE — 99282 EMERGENCY DEPT VISIT SF MDM: CPT

## 2019-01-25 PROCEDURE — 96372 THER/PROPH/DIAG INJ SC/IM: CPT

## 2019-02-01 ENCOUNTER — HOSPITAL ENCOUNTER (OUTPATIENT)
Dept: HOSPITAL 53 - M PAIN | Age: 49
End: 2019-02-01
Attending: NURSE PRACTITIONER
Payer: MEDICARE

## 2019-02-01 DIAGNOSIS — M79.7: ICD-10-CM

## 2019-02-01 DIAGNOSIS — Z96.652: ICD-10-CM

## 2019-02-01 DIAGNOSIS — E11.9: ICD-10-CM

## 2019-02-01 DIAGNOSIS — G47.30: ICD-10-CM

## 2019-02-01 DIAGNOSIS — Z79.899: ICD-10-CM

## 2019-02-01 DIAGNOSIS — M79.18: Primary | ICD-10-CM

## 2019-02-01 DIAGNOSIS — J45.909: ICD-10-CM

## 2019-02-04 NOTE — ECWPNPC
PATIENT NAME: TOREY TRUJILLO

: 1970

GENDER: MALE

MRN: [id number]

VISIT DATE: 2019

DISCHARGE DATE: 1949

VISIT LOCKED DATE TIME: 

PHYSICIAN: ALEKSANDAR WINSLOW MD

RESOURCE: ALEKSANDAR WINSLOW MD

 

           

           

REASON FOR APPOINTMENT

           

          1. TPI

           

HISTORY OF PRESENT ILLNESS

           

      HISTORY OF PRESENT ILLNESS:

      PAIN

           

           

          THE PATIENT DESCRIBES THE PAIN...

           

      FALL RISK SCREENING:

      SCREENING

           

           

          :NO FALLS IN THE PAST YEAR

           

CURRENT MEDICATIONS

           

          TAKING ALBUTEROL SULFATE 2 PUFFS INHALATION EVERY 4 HOURS AS

          NEEDED, NOTES: 

          TAKING MULTIVITAMIN 1 TABLET ORALLY DAILY, NOTES: 600

          TAKING ASCORBIC ACID 600 MG CAPSULE ORALLY DAILY, NOTES: 600

          TAKING MAY HAVE - - CBD OIL PO AS DIRECTED, NOTES: 

          TAKING FISH  MG CAPSULE ORALLY BID, NOTES: 600

          TAKING VITAMIN D 2000 UNIT TABLET ORALLY DAILY, NOTES: 600

          TAKING LIDODERM 5 % PATCH 1-2 PATCH TO SKIN REMOVE AFTER 12 HOURS

          EXTERNALLY TO PAIN FULAREAS OF BACK, KNEE/SHOULDER AS NEEDED ON

          12, OFF 12, NOTES: 

          TAKING HYDROCODONE-ACETAMINOPHEN  MG TABLET 1 TABLET AS

          NEEDED ORALLY BID PRN PAIN MDD=2, NOTES: 

          TAKING BACLOFEN 10 MG TABLET 1 TABLET WITH FOOD OR MILK ORALLY

          THREE TIMES A DAY, NOTES: 

          TAKING ARTHROTEC 75-0.2 MG TABLET DELAYED RELEASE 1 TABLET WITH

          FOOD ORALLY TWICE A DAY, NOTES: 2200

          TAKING ZYRTEC 10 MG 1 TAB ORAL DAILY, NOTES: 2200

          NOT-TAKING LISINOPRIL 10 10 MG TABLET ORAL DAILY

          DISCONTINUED LIDODERM 5 % PATCH 1 PATCH TO SKIN REMOVE AFTER 12

          HOURS EXTERNALLY APPLY TO LOW BACK AND KNEE ONCE A DAY ON 12 HRS

          OFF 12 HOURS, NOTES: DUPLICATE

          DISCONTINUED INDOMETHACIN 50 MG CAPSULE 1 CAPSULE WITH FOOD OR

          MILK ORALLY TWICE A DAY TAKE WITH FOOD

          DISCONTINUED SIMVASTATIN 40 40MG TABLET ORAL DAILY

          DISCONTINUED NEXIUM 20 MG CAPSULE DELAYED RELEASE 1 CAPSULE

          ORALLY ONCE A DAY

          DISCONTINUED OMEPRAZOLE 20 MG CAPSULE DELAYED RELEASE 1 CAPSULE

          ORALLY ONCE A DAY

          DISCONTINUED METFORMIN 1500 MG 1 TAB ORAL DAILY

          DISCONTINUED CYMBALTA 30 MG 1 CAPSULE ORALLY DAILY

          DISCONTINUED DULOXETINE HCL 30 MG CAPSULE DELAYED RELEASE

          PARTICLES 1 CAPSULE ORALLY DAILY

          DISCONTINUED MONTELUKAST SODIUM 10 MG TABLET ORALLY ONCE A DAY

          DISCONTINUED SOMA 250 MG TABLET 1 TABLET AS NEEDED ORALLY BID

          DISCONTINUED NORCO  MG TABLET 1 TABLET AS NEEDED ORALLY

          EVERY 6 HRS PRN PAIN MDD=4

          DISCONTINUED NUCYNTA  MG TABLET EXTENDED RELEASE 12 HOUR

          ORALLY AT BEDTIME

          DISCONTINUED DICLOFENAC SODIUM 75 MG TABLET DELAYED RELEASE 1

          TABLET ORALLY BID, NOTES: DUPLICATE

          DISCONTINUED PREDNISONE 10 MG TABLET 5 TABLET ORALLY TAKE 5 TABS

          X 2 DAYS, 4 TAB X 2 DAY, 3 TAB X 2 DAY, 2 TAB X 2 DAY AND 1 TAB X

          2 DAYS

          DISCONTINUED NEXIUM 20 MG 1 TAB ORAL DAILY

          DISCONTINUED AMITRIPTYLINE HCL 25 MG TABLET 1 TABLET AT BEDTIME

          ORALLY ONCE A DAY

          DISCONTINUED COMBI RX CREAM 1 DOSE AS DIRECTED

          MEDICATION LIST REVIEWED AND RECONCILED WITH THE PATIENT

           

PAST MEDICAL HISTORY

           

          ASTHMA

          SLEEP APNEA, USES CPAP

          HEARING LOSS

          DIABETES

          REFLUX

          L KNEE ARTHRITIS

          BACK PAIN

          RIGHT KNEE PAIN

          LEFT ANKLE PAIN

          RIGHT SHOULDER PAIN

          FIBROMYALGIA

           

ALLERGIES

           

          ENVIRONMENTAL: CONGESTION: ALLERGY

           

SURGICAL HISTORY

           

          RIGHT SHOULDER RECONSTRUCTION/ ROTATOR CUFF REPAIR 

          LEFT ANKLE RECONSTRUCTION 

          RIGHT KNEE MENISCUS REPAIR 

          UP3 ( ORAL SURGERY) 

          RIGHT SHOULDER REPAIR 

           

FAMILY HISTORY

           

          FATHER:  68 YRS, DIAGNOSED WITH HEART DISEASE

          MOTHER: ALIVE 64 YRS

          3 SISTER(S) - HEALTHY. 2 SON(S) - HEALTHY.

           

SOCIAL HISTORY

           

          GENERAL:

           

          TOBACCO USE

          ARE YOU A:NONSMOKER

           

           

          ALCOHOL SCREENING

          DID YOU HAVE A DRINK CONTAINING ALCOHOL IN THE PAST YEAR?YES

          POINTS1

          INTERPRETATIONNEGATIVE

          HOW OFTEN DID YOU HAVE A DRINK CONTAINING ALCOHOL IN THE PAST

          YEAR?MONTHLY OR LESS (1 POINT)

           

           

          RECREATIONAL DRUG USE

          DRUG USE?NO

           

           

          CAFFEINE

          CAFFEINE USE?YES

          HOW OFTEN AND HOW MUCH? 1 CUP PER DAY

           

           

          Buddhist

          [id number] NONE

           

           

          LANGUAGE

          LANGUAGES SPOKEN:ENGLISH

           

           

          LEARNING BARRIERS / SPECIAL NEEDS

          BARRIERS TO LEARNING?NO

          HEARING IMPAIRED?NO

          VISION IMPAIRED?YES

          :CORRECTIVE LENSES READING

          COGNITIVELY IMPAIRED?NO

          READINESS TO LEARN?YES

          LEARNING PREFERENCES?NO

          LEARNING CAPABILITIES PRESENT?YES

          EMOTIONAL BARRIERS?NO

          SPECIAL DEVICES?YES

          :CANE, OTHER KNEE AND ANKLE BRACE

           NEEDED?NO

           

           

          DOMESTIC VIOLENCE

          DO YOU FEEL SAFE IN YOUR ENVIRONMENT?YES

           

           

          DIET: CARBOHYDRATE CONTROLLED.

           

           

          MARITAL STATUS: .

           

           

          OTHERS AT HOME: CHILD 2 BOYS.

           

           

          NEW PATIENT PAIN DIARY

          FROM 0-10, WHAT LEVEL IS YOUR PAIN TODAY?6

           

           

          PAIN CLINIC PFS, CLERGY, PUBLIC HEALTH REFERRALS

          PFS REFERRAL NEEDED?NO

          CLERGY REFERRAL NEEDED?NO

          PUBLIC HEALTH REFERRAL NEEDED?NO

          WAS THE PROVIDER NOTIFIED OF ANY PERTINENT INFO? N/A

          HAS THE PATIENT BEEN EDUCATED REGARDING HIS/HER PLAN OF CARE?YES

          HAS THE PATIENT BEEN EDUCATED REGARDING PAIN, THE RISK FOR PAIN,

          THE IMPORTANCE OF EFFECTIVE PAIN MANAGEMENT, AND THE PAIN

          ASSESSMENT PROCESS?YES

           

           

          ADVANCE DIRECTIVE

          ADVANCE DIRECTIVE DISCUSSED WITH PATIENT:YES PT HCP ALVARO TRUJILLO [phone]

           

           

          REVIEWED WITH PT 18 1325 [id number] REVIEWED WITH PT.

          AD.

           

HOSPITALIZATION/MAJOR DIAGNOSTIC PROCEDURE

           

          KETOACIDOSIS 

           

REVIEW OF SYSTEMS

           

      REVIEWED BY:

           

          PROVIDER:    _____ .

           

      CONSTITUTIONAL:

           

          ANY CHANGE IN YOUR MEDICAL CONDITION?    NO . CHILLS    NO .

          FEVER    NO .

           

      INFECTION:

           

          DO YOU HAVE NEW INFECTIONS?    NO . DO YOU HAVE HISTORY OF MRSA? 

            NO .

           

      MUSCULOSKELETAL:

           

          ANY NEW PATTERNS OF PAIN OR NUMBNESS?    NO .

           

      GASTROENTEROLOGY:

           

          ANY NEW CHANGE IN BOWEL CONTROL?    NO .

           

      GENITOURINARY:

           

          ANY NEW CHANGE IN BLADDER CONTROL?    NO . IS THERE A CHANCE YOU

          COULD BE PREGNANT?    NO .

           

      HEMATOLOGY/LYMPH:

           

          DO YOU TAKE ANY BLOOD THINNERS? (FOR EXAMPLE- COUMADIN, PLAVIX,

          AGGRENOX, PLATEL, PRADAXA, OR XARELTO)    NO . WHEN WAS YOUR LAST

          DOSE?    DATE: TIME:  .

           

      NEUROLOGY:

           

          HAVE YOU FALLEN IN THE PAST 12 MONTHS?    NO . ANY NEW EXTREMITY

          NUMBNESS OR WEAKNESS?    NO .

           

      CARDIOLOGY:

           

          DO YOU HAVE A PACEMAKER OR DEFIBRILLATOR?    NO .

           

      RESPIRATORY:

           

          HAVE YOU BEEN SICK IN THE PAST WEEK?    NO . FEVER    NO . FLU

          LIKE SYMPTOMS?    NO . COUGH    NO .

           

      INTEGUMENTARY:

           

          DO YOU HAVE ANY RASHES OR OPEN SORES?    NO .

           

      ALLERGIC/IMMUNO:

           

          ARE YOU ALLERGIC TO IV DYE?    NO . ANY NEW ALLERGIES?    NO .

           

      PSYCHIATRIC:

           

          DO YOU HAVE THOUGHTS OF HURTING YOURSELF OR SOMEONE ELSE?    NO .

          ARE YOU ABUSED, NEGLECTED, OR IN AN UNSAFE ENVIRONMENT?    NO .

           

      ENDOCRINOLOGY:

           

          ARE YOU DIABETIC?    YES   FSBS THIS A.M. AT HOME 83 @ 0700 .

           

      OTHER:

           

          DO YOU NEED ANY PRESCRIPTIONS?    NO . IF YES, PLEASE LIST:   

          ____ . ANY NEW PROBLEMS WITH YOUR MEDICATIONS?    NO . WHEN DID

          YOU LAST EAT?       1830 . WHEN DID YOU LAST DRINK?     

          0600 . WHAT DID YOU LAST DRINK?    WATER . NAME OF PERSON DRIVING

          YOU HOME?    JOÃO TRUJILLO . DO YOU HAVE ANY OTHER QUESTIONS OR

          CONCERNS    NO .

           

VITAL SIGNS

           

          .4 LBS, HT 71 IN, BMI 29.90 INDEX, /75 MM HG, HR 80

          /MIN, RR 16 /MIN, TEMP 97.1 F, OXYGEN SAT % 98%, BLOOD GLUCOSE

          LEVEL 83 AT HOME, SAFE IN ENV? (Y/N) Y, NA INITIALS SC 08:42,

          REVIEWED BY: AD.

           

ASSESSMENTS

           

          MYALGIA, OTHER SITE - M79.18 (PRIMARY)

           

PROCEDURES

           

      PN TRIGGER POINT INJECTION WITH STEROIDS

          PRE PROCEDURE DIAGNOSIS    1. MYALGIA 2. PAIN AT BILATERAL LOW

          BACK AREA

          POST PROCEDURE DIAGNOSIS    1. MYALGIA 2. PAIN AT BILATERAL LOW

          BACK AREA

          PROCEDURE    TRIGGER POINT INJECTION AT BILATERAL LOW BACK AREA

          SURGEON    DR. ALEKSANDAR WINSLOW

          ASSISTANT    NONE

          ANESTHESIA    LOCAL

          PRE PROCEDURE NOTE    THE PATIENT HAS A HISTORY OF CHRONIC PAIN

          AT THE RIGHT AND LEFT LOW BACK AREA. I EVALUATE THE PATIENT AND

          REVIEWED THE CHART. THERE IS EVIDENCE OF BANDS OF TISSUE WITH

          RESTRICTION OF MOVEMENT AND PRESENCE OF TRIGGER POINT AT THE

          AFFECTED AREA. I WENT OVER THE RISKS, ALTERNATIVES, AND BENEFITS

          ASSOCIATED WITH THIS PROCEDURE. THE PATIENT WOULD LIKE TO PROCEED

          AND GIVE CONSENT TO PERFORMED THE PROCEDURE. THE PATIENT DENIES

          UNEXPLAINABLE WEIGHT LOSS, FEVER, CHILLS, OR NEW CHANGES IN

          URINARY OR BOWEL CONTROL

          DESCRIPTION OF PROCEDURE    THE PATIENT WAS BROUGHT TO THE

          PROCEDURE ROOM AND PLACED IN THE SITTING POSITION. THE AREA WAS

          CLEANED WITH ALCOHOL. THE PROCEDURE WAS DONE USING ASEPTIC

          STERILE TECHNIQUE. I CHECKED LATERALITY AND THE LEVEL WHERE THE

          PROCEDURE WAS GOING TO BE PERFORMED WITH THE PATIENT AND THE

          SUPPORTING STAFF AT THE MOMENT OF THE TIME OUT IN THE PROCEDURE

          ROOM. USING A 25-GAUGE NEEDLE, TRIGGER POINTS WERE INJECTED AT

          THE RIGHT AND LEFT LOW BACK AREA WITH A TOTAL OF 40 ML OF

          BUPIVACAINE 0.25% AND KENALOG 40 MG. THERE WAS NO EVIDENCE OF

          BLOOD, PARESTHESIA OR CEREBROSPINAL FLUID DURING THE PROCEDURE.

          THE PATIENT WAS SENT TO THE RECOVERY ROOM. THE PATIENT WAS MOVING

          THE EXTREMITIES AND DOING WELL. THERE WAS NO COMPLICATION DURING

          THE PROCEDURE

          POST PROCEDURE NOTE    THE PATIENT WILL BE SEEN IN A FOLLOW UP IN

          THE NEXT FEW WEEKS. INSTRUCTIONS WERE GIVEN, QUESTIONS WERE

          ANSWERED, AND THE PATIENT EXPRESSED UNDERSTANDING AND AGREES WITH

          THE PLAN. I, JENNA CAMPBELL, DOCUMENTED THE ABOVE INFORMATION

          ACTING AS A SCRIBE FOR DR. WINSLOW. I HAVE REVIEWED THE ABOVE

          DOCUMENT, WRITTEN BY JENNA CURRANIBGODWIN AND I VERIFY THAT IT

          IS ACCURATE.

           

PROCEDURE CODES

           

          [id number] INJ TRIGGER POINT / MUSC

           

DISPOSITION & COMMUNICATION

           

FOLLOW UP

           

          3 WEEKS

           

 

ELECTRONICALLY SIGNED BY ALEKSANDAR WINSLOW MD, MD ON

          2019 AT 05:30 PM EST

           

           

           

 

DISCLAIMER :

THIS IS A VISIT SUMMARY EXTRACTED FROM THE [employer] CHART.

IT IS NOT A COPY OF THE [employer] PROGRESS NOTE.

BRODY

## 2019-02-11 NOTE — ECWPNPC
PATIENT NAME: TOREY TRUJILLO

: 1970

GENDER: MALE

MRN: [id number]

VISIT DATE: 2019

DISCHARGE DATE: 19 1230

VISIT LOCKED DATE TIME: 

PHYSICIAN: JELANI TRAN

RESOURCE: JELANI TRAN

 

           

           

REASON FOR APPOINTMENT

           

          1. POST TPI

           

HISTORY OF PRESENT ILLNESS

           

      HISTORY OF PRESENT ILLNESS:  HERE FOR POST

          PROCEDURE F/U.HAD TPI BILAT. LOW BACK ON 19.FEELS THAT IT

          HELPED AND CONTINUES TODAY.ALSO ATTENDING ACCUPUNCTURE.CURRENTLY

          USING CBD OI.USING HYDROCODONE 10/325 INFREQUENTLY FOR SEVER PAIN

          EPISODES.USING BACLFEN TID AND INDOCIN QHS.IN A HOME WORKOUT AND

          REHAB/WEIGHT LOSS PROGRAM AND DOING WELL.

      PAIN

           

           

          THE PATIENT DESCRIBES THE PAIN...

           

      FALL RISK SCREENING:

      SCREENING

           

           

          :NO FALLS IN THE PAST YEAR

           

CURRENT MEDICATIONS

           

          TAKING ALBUTEROL SULFATE 2 PUFFS INHALATION EVERY 4 HOURS AS

          NEEDED

          TAKING MULTIVITAMIN 1 TABLET ORALLY DAILY

          TAKING ASCORBIC ACID 600 MG CAPSULE ORALLY DAILY

          TAKING MAY HAVE - - CBD OIL PO AS DIRECTED

          TAKING FISH  MG CAPSULE ORALLY BID

          TAKING VITAMIN D 2000 UNIT TABLET ORALLY DAILY

          TAKING LIDODERM 5 % PATCH 1-2 PATCH TO SKIN REMOVE AFTER 12 HOURS

          EXTERNALLY TO PAIN FULAREAS OF BACK, KNEE/SHOULDER AS NEEDED ON

          12, OFF 12

          TAKING HYDROCODONE-ACETAMINOPHEN  MG TABLET 1 TABLET AS

          NEEDED ORALLY BID PRN PAIN MDD=2

          TAKING BACLOFEN 10 MG TABLET 1 TABLET WITH FOOD OR MILK ORALLY

          THREE TIMES A DAY

          TAKING ARTHROTEC 75-0.2 MG TABLET DELAYED RELEASE 1 TABLET WITH

          FOOD ORALLY TWICE A DAY

          TAKING ZYRTEC 10 MG 1 TAB ORAL DAILY

          NOT-TAKING LISINOPRIL 10 10 MG TABLET ORAL DAILY

          MEDICATION LIST REVIEWED AND RECONCILED WITH THE PATIENT

           

PAST MEDICAL HISTORY

           

          ASTHMA

          SLEEP APNEA, USES CPAP

          HEARING LOSS

          DIABETES

          REFLUX

          L KNEE ARTHRITIS

          BACK PAIN

          RIGHT KNEE PAIN

          LEFT ANKLE PAIN

          RIGHT SHOULDER PAIN

          FIBROMYALGIA

           

ALLERGIES

           

          ENVIRONMENTAL: CONGESTION: ALLERGY

           

SURGICAL HISTORY

           

          RIGHT SHOULDER RECONSTRUCTION/ ROTATOR CUFF REPAIR 

          LEFT ANKLE RECONSTRUCTION 

          RIGHT KNEE MENISCUS REPAIR 

          UP3 ( ORAL SURGERY) 

          RIGHT SHOULDER REPAIR 

           

FAMILY HISTORY

           

          FATHER:  68 YRS, DIAGNOSED WITH HEART DISEASE

          MOTHER: ALIVE 64 YRS

          3 SISTER(S) - HEALTHY. 2 SON(S) - HEALTHY.

           

SOCIAL HISTORY

           

          GENERAL:

           

          TOBACCO USE

          ARE YOU A:NONSMOKER

           

           

          ALCOHOL SCREENING

          DID YOU HAVE A DRINK CONTAINING ALCOHOL IN THE PAST YEAR?YES

          POINTS1

          INTERPRETATIONNEGATIVE

          HOW OFTEN DID YOU HAVE A DRINK CONTAINING ALCOHOL IN THE PAST

          YEAR?MONTHLY OR LESS (1 POINT)

           

           

          RECREATIONAL DRUG USE

          DRUG USE?NO

           

           

          CAFFEINE

          CAFFEINE USE?YES

          HOW OFTEN AND HOW MUCH? 1 CUP PER DAY

           

           

          Rastafarian

          [id number] NONE

           

           

          LANGUAGE

          LANGUAGES SPOKEN:ENGLISH

           

           

          LEARNING BARRIERS / SPECIAL NEEDS

          BARRIERS TO LEARNING?NO

          HEARING IMPAIRED?NO

          VISION IMPAIRED?YES

          :CORRECTIVE LENSES READING

          COGNITIVELY IMPAIRED?NO

          READINESS TO LEARN?YES

          LEARNING PREFERENCES?NO

          LEARNING CAPABILITIES PRESENT?YES

          EMOTIONAL BARRIERS?NO

          SPECIAL DEVICES?YES

          :CANE, OTHER KNEE AND ANKLE BRACE

           NEEDED?NO

           

           

          DOMESTIC VIOLENCE

          DO YOU FEEL SAFE IN YOUR ENVIRONMENT?YES

           

           

          DIET: CARBOHYDRATE CONTROLLED.

           

           

          MARITAL STATUS: .

           

           

          OTHERS AT HOME: CHILD 2 BOYS.

           

           

          NEW PATIENT PAIN DIARY

          FROM 0-10, WHAT LEVEL IS YOUR PAIN TODAY?6

           

           

          PAIN CLINIC PFS, CLERGY, PUBLIC HEALTH REFERRALS

          PFS REFERRAL NEEDED?NO

          CLERGY REFERRAL NEEDED?NO

          PUBLIC HEALTH REFERRAL NEEDED?NO

          WAS THE PROVIDER NOTIFIED OF ANY PERTINENT INFO? N/A

          HAS THE PATIENT BEEN EDUCATED REGARDING HIS/HER PLAN OF CARE?YES

          HAS THE PATIENT BEEN EDUCATED REGARDING PAIN, THE RISK FOR PAIN,

          THE IMPORTANCE OF EFFECTIVE PAIN MANAGEMENT, AND THE PAIN

          ASSESSMENT PROCESS?YES

           

           

          ADVANCE DIRECTIVE

          ADVANCE DIRECTIVE DISCUSSED WITH PATIENT:YES PT HCP ALVARO TRUJILLO [phone]

           

           

          REVIEWED WITH PT 18 1325 [id number] REVIEWED WITH PT.

          AD.

           

HOSPITALIZATION/MAJOR DIAGNOSTIC PROCEDURE

           

          KETOACIDOSIS 2013

           

REVIEW OF SYSTEMS

           

      REVIEWED BY:

           

          PROVIDER:    JELANI RACHEL .

           

      CONSTITUTIONAL:

           

          ANY CHANGE IN YOUR MEDICAL CONDITION?    NO . CHILLS    NO .

          FEVER    NO .

           

      INFECTION:

           

          DO YOU HAVE NEW INFECTIONS?    NO . DO YOU HAVE HISTORY OF MRSA? 

            NO .

           

      MUSCULOSKELETAL:

           

          ANY NEW PATTERNS OF PAIN OR NUMBNESS?    NO .

           

      GASTROENTEROLOGY:

           

          ANY NEW CHANGE IN BOWEL CONTROL?    NO .

           

      GENITOURINARY:

           

          ANY NEW CHANGE IN BLADDER CONTROL?    NO . IS THERE A CHANCE YOU

          COULD BE PREGNANT?    NO .

           

      HEMATOLOGY/LYMPH:

           

          DO YOU TAKE ANY BLOOD THINNERS? (FOR EXAMPLE- COUMADIN, PLAVIX,

          AGGRENOX, PLATEL, PRADAXA, OR XARELTO)    NO . WHEN WAS YOUR LAST

          DOSE?    DATE: TIME:  .

           

      NEUROLOGY:

           

          HAVE YOU FALLEN IN THE PAST 12 MONTHS?    NO . ANY NEW EXTREMITY

          NUMBNESS OR WEAKNESS?    NO .

           

      CARDIOLOGY:

           

          DO YOU HAVE A PACEMAKER OR DEFIBRILLATOR?    NO .

           

      RESPIRATORY:

           

          HAVE YOU BEEN SICK IN THE PAST WEEK?    NO . FEVER    NO . FLU

          LIKE SYMPTOMS?    NO . COUGH    NO .

           

      INTEGUMENTARY:

           

          DO YOU HAVE ANY RASHES OR OPEN SORES?    NO .

           

      ALLERGIC/IMMUNO:

           

          ARE YOU ALLERGIC TO IV DYE?    NO . ANY NEW ALLERGIES?    NO .

           

      PSYCHIATRIC:

           

          DO YOU HAVE THOUGHTS OF HURTING YOURSELF OR SOMEONE ELSE?    NO .

          ARE YOU ABUSED, NEGLECTED, OR IN AN UNSAFE ENVIRONMENT?    NO .

           

      ENDOCRINOLOGY:

           

          ARE YOU DIABETIC?    YES .

           

      OTHER:

           

          DO YOU NEED ANY PRESCRIPTIONS?    YES, NORCO . IF YES, PLEASE

          LIST:    ____ . ANY NEW PROBLEMS WITH YOUR MEDICATIONS?    NO .

          WHEN DID YOU LAST EAT?    ____ . WHEN DID YOU LAST DRINK?    ____

          . WHAT DID YOU LAST DRINK?    ____ . NAME OF PERSON DRIVING YOU

          HOME?    ____ . DO YOU HAVE ANY OTHER QUESTIONS OR CONCERNS   

          YES, LEFT KNEE TO BE REPLACED .

           

VITAL SIGNS

           

          .4 LBS, HT 71 IN, BMI 29.48 INDEX, /75 MM HG, HR 76

          /MIN, RR 16 /MIN, TEMP 97.6 F, OXYGEN SAT % 98%, NA INITIALS SC

          11:49, REVIEWED BY: EM.

           

EXAMINATION

           

      GENERAL EXAMINATION:

          GENERAL APPEARANCE:AWAKE,ALERT ,PLEAASANT .

           

          PSYCHAFFECT NORMAL .

           

          LUNGS:LUNG TEJEDA ARE CLEAR TO AUSCULTATION BILATERALLY. GOOD

          MOVEMENT OF AIR .

           

          HEART:S1, S2 IN A REGULAR RATE AND RHYTHM. NO SIGNIFICANT

          MURMURS, RUBS OR GALLOPS NOTED .

           

ASSESSMENTS

           

          MYALGIA, OTHER SITE - M79.18 (PRIMARY)

           

          FIBROMYALGIA - M79.7

           

TREATMENT

           

      MYALGIA, OTHER SITE

          CONTINUE LIDODERM PATCH, 5 %, 1-2 PATCH TO SKIN REMOVE AFTER 12

          HOURS, EXTERNALLY, TO PAIN FULAREAS OF BACK, KNEE/SHOULDER AS

          NEEDED ON 12, OFF 12

          REFILL HYDROCODONE-ACETAMINOPHEN TABLET,  MG, 1 TABLET AS

          NEEDED, ORALLY, BID PRN PAIN MDD=2

          CONTINUE BACLOFEN TABLET, 10 MG, 1 TABLET WITH FOOD OR MILK,

          ORALLY, THREE TIMES A DAY

          CONTINUE ARTHROTEC TABLET DELAYED RELEASE, 75-0.2 MG, 1 TABLET

          WITH FOOD, ORALLY, TWICE A DAY

          NOTES: ISTOP REGISTRY REVIEWED AND DEMONSTRATES COMPLLIANCE.

          BRINGS IN MEDICATIONS WHICH IS APPROPRIATE FOR WHAT WAS

          DISPENSED. RECENT URINE TOXICOLOGY REVIEWED. NO UNAUTHORIZED

          MEDICATIONS. NO ILLICIT SUBSTANCES AND PRESCRIBED MEDICATIONS

          WERE PRESENT. URINE TOX TODAY, RISKS AND BENEFITS OF

          NARCOTIC/OPIOD MEDICATIONS WERE REVIEWED WITH PATIENT - THIS

          INCLUDES BUT IS NOT LIMITED TO RISK OF DEPENDANCE/DEVELOPMENT OF

          ADDICTION, MOOD DISTURBANCE AND DEPRESSION, OSTEOPOROSIS,

          HORMONAL AND LABIDAL CHANGES, RESPIRATORY DEPRESSION AND DEATH.

          PATIENT IS ADVISED NOT TO DRIVE OR DRINK ALCOHOL WHILE ON THESE

          MEDICATIONS.

           

DISPOSITION & COMMUNICATION

           

FOLLOW UP

           

          3 MONTHS

           

 

ELECTRONICALLY SIGNED BY VIRGINIE SMITH ON

          02/10/2019 AT 03:34 PM EST

           

           

           

 

DISCLAIMER :

THIS IS A VISIT SUMMARY EXTRACTED FROM THE [employer] CHART.

IT IS NOT A COPY OF THE [employer] PROGRESS NOTE.

BRODY

## 2020-10-27 ENCOUNTER — HOSPITAL ENCOUNTER (OUTPATIENT)
Dept: HOSPITAL 53 - M PAIN | Age: 50
End: 2020-10-27
Attending: NURSE PRACTITIONER
Payer: MEDICARE

## 2020-10-27 DIAGNOSIS — G47.30: ICD-10-CM

## 2020-10-27 DIAGNOSIS — Z79.899: ICD-10-CM

## 2020-10-27 DIAGNOSIS — M79.7: ICD-10-CM

## 2020-10-27 DIAGNOSIS — M25.562: Primary | ICD-10-CM

## 2020-10-27 DIAGNOSIS — M47.816: ICD-10-CM

## 2020-10-27 DIAGNOSIS — J45.909: ICD-10-CM

## 2020-10-27 DIAGNOSIS — K21.9: ICD-10-CM

## 2020-10-27 DIAGNOSIS — E11.9: ICD-10-CM

## 2020-10-29 NOTE — ECWPNPC
PATIENT NAME: TOREY TRUJILLO

: 1970

GENDER: MALE

MRN: [id number]

VISIT DATE: 10/27/2020

DISCHARGE DATE: 10/27/20 0939

VISIT LOCKED DATE TIME: 

PHYSICIAN: JOSE G BLAND 

RESOURCE: JOSE G BLAND 

 

           

           

REASON FOR APPOINTMENT

           

          1. CHRONIC PAIN

           

HISTORY OF PRESENT ILLNESS

           

      DEPRESSION SCREENING:

      PHQ-2 (2015 EDITION)

           

           

          LITTLE INTEREST OR PLEASURE IN DOING THINGS?NOT AT ALL

          FEELING DOWN, DEPRESSED, OR HOPELESS?NOT AT ALL

          TOTAL SCORE0

           

           49-YEAR-OLD MALE IN FOR NEW PATIENT CONSULT. PATIENT HAS A

          HISTORY OF BACK AND KNEE PAIN WHICH STEMS FROM A LONG HISTORY IN

          THE  WHERE HE WAS PART OF [employer] AND PERFORMED MANY

          JUMPS. HE RATES HIS PAIN CURRENTLY AT A 7 OUT OF 10 AND DESCRIBES

          IT AS ACHING, CONTINUOUS, INTERMITTENT, SHARP, STABBING, TENDER,

          THROBBING, SORE, AND SHOOTING. PATIENT [facility]

          [facility] BY [facility] CHIROPRACTIC CARE, ACUPUNCTURE, AND

          CUPPING. HOWEVER [facility]ANG[facility] THE TREATMENT MODEL AND

          THIS IS NO LONGER AVAILABLE TO THE PATIENT. HE HAD BEEN A PATIENT

          AT THIS CLINIC BEFORE AND PREVIOUSLY BEEN PRESCRIBED NORCO 10/325

          MG TWICE A DAY WITH GOOD RESULTS.

           

      GENERAL:

           - - -.

           

      FALL RISK SCREENING:

      SCREENING

           

           

          :TWO OR MORE FALLS WITHOUT INJURY IN THE PAST YEAR PT STATES THAT

          HE TRIPS OVER THINGS IN THE WOODS WHILE HUNTING, LEGS DO NOT GIVE

          OUT, NO INJURY

           

      PAIN SCREENING:

      PATIENT HAS A COMPLAINT OF ACUTE OR CHRONIC PAIN

           

           

          :YES

          LOCATION OF PAIN:MID BACK, LOW BACK, KNEES MID-LOW BACK, RIGHT

          KNEE

          INTENSITY OF PAIN (SCALE OF 1 TO 10):7

          WHAT DOES YOUR PAIN FEEL LIKE:ACHING, CONTINOUS, INTERMITTENT,

          SHARP, STABBING, TENDER, THROBBING, SORE, SHOOTING

          DURATION:CONTINOUS, ALL DAY

          PAIN IS INCREASED BY:ACTIVITIES, PROLONGED STANDING, OTHERS

          PROLONGED ACTIVITY, WALKING ON UNEVEN GROUND, LIFTING

          PAIN IS DECREASED BY:OTHERS ICE, HOT TUB, DECOMPRESSION, CUPPING,

          MASSAGE, ACUPUNTURE

          TREATMENT/MEDICATIONS USED TO MANAGE PAIN:OTC PAIN RELIEVERS,

          NSAIDS, PHYSICAL THERAPY, ACCUPUNCTURE

           

      NURSING NOTE:

           - - -.

           

      PAIN CENTER INTAKE QUESTIONS:

      DO YOU HAVE A HISTORY OF MRSA?

           

           

          :NO

           

      DO YOU TAKE A BLOOD THINNERS?

           

           

          :NO

           

      DO YOU HAVE ANY BLEEDING DISORDERS?

           

           

          :NO

           

      ANY NEW NUMBNESS OR WEAKNESS IN YOUR LEGS OR ARMS?

           

           

          :NO

           

      ANY PACEMAKER,DEFIBRILLATOR, OR DORSAL COLUMN

          STIMULATOR?

           

           

          :NO

           

      DO YOU HAVE ANY RASHES OR OPEN SORES?

           

           

          :NO

           

      ARE YOU ALLERGIC TO IV DYE?

           

           

          :NO

           

      ARE YOU DIABETIC?

           

           

          :YES TYPE II, MANAGED WITH DIET

           

      ANY NEW PROBLEMS WITH YOUR MEDICATIONS?

           

           

          :NO

           

      HAVE YOU RECEIVED A VACCINE IN THE PAST 30 DAYS?

           

           

          :NO

           

      DO YOU PLAN TO RECEIVE A VACCINE IN THE NEXT 21

          DAYS?

           

           

          :YES

          IF SO WHAT VACCINE AND WHEN? PT RECEIVES ALLERGY MEDICATIONS

          WEEKLY

           

      DO YOU NEED ANY PRESCRIPTION?

           

           

          :NO

           

      DO YOU TAKE ANY IMMUNOSUPPRESSIVE MEDICATIONS?

           

           

          :NO

           

CURRENT MEDICATIONS

           

          TAKING ALBUTEROL SULFATE 2 PUFFS INHALATION EVERY 4 HOURS AS

          NEEDED

          TAKING MULTIVITAMIN 1 TABLET ORALLY DAILY

          TAKING ASCORBIC ACID 600 MG CAPSULE ORALLY DAILY

          TAKING MAY HAVE - - CBD OIL PO AS DIRECTED

          TAKING VITAMIN D 2000 UNIT TABLET ORALLY DAILY

          TAKING ZYRTEC 10 MG 1 TAB ORAL DAILY

          TAKING LIDODERM 5 % PATCH 1-2 PATCH TO SKIN REMOVE AFTER 12 HOURS

          EXTERNALLY TO PAIN FULAREAS OF BACK, KNEE/SHOULDER AS NEEDED ON

          12, OFF 12

          NOT-TAKING FISH  MG CAPSULE ORALLY BID

          NOT-TAKING BACLOFEN 10 MG TABLET 1 TABLET WITH FOOD OR MILK

          ORALLY THREE TIMES A DAY

          NOT-TAKING ARTHROTEC 75-0.2 MG TABLET DELAYED RELEASE 1 TABLET

          WITH FOOD ORALLY TWICE A DAY

          NOT-TAKING HYDROCODONE-ACETAMINOPHEN  MG TABLET 1 TABLET AS

          NEEDED ORALLY BID PRN PAIN MDD=2

          NOT-TAKING LISINOPRIL 10 10 MG TABLET ORAL DAILY

          MEDICATION LIST REVIEWED AND RECONCILED WITH THE PATIENT

           

PAST MEDICAL HISTORY

           

          ASTHMA

          SLEEP APNEA, USES CPAP

          HEARING LOSS

          DIABETES TYPE II

          REFLUX

          L KNEE ARTHRITIS

          BACK PAIN

          RIGHT KNEE PAIN

          LEFT ANKLE PAIN

          RIGHT SHOULDER PAIN

          FIBROMYALGIA

           

ALLERGIES

           

          ENVIRONMENTAL: CONGESTION - ALLERGY

           

SURGICAL HISTORY

           

          RIGHT SHOULDER RECONSTRUCTION/ ROTATOR CUFF REPAIR 

          LEFT ANKLE RECONSTRUCTION 

          RIGHT KNEE MENISCUS REPAIR 

          UP3 ( ORAL SURGERY) 

          RIGHT SHOULDER REPAIR 

           

FAMILY HISTORY

           

          FATHER:  68 YRS, DIAGNOSED WITH UNSPECIFIED HEART DISEASE

          MOTHER: ALIVE 64 YRS

          3 SISTER(S) - HEALTHY. 2 SON(S) - HEALTHY.

           

SOCIAL HISTORY

           

          GENERAL:

           

          TOBACCO USE

          ARE YOU A:NONSMOKER

           

           

          LATEX QUESTIONNAIRE

          LATEX ALLERGY : HAVE YOU EVER DEVELOPED ANY TYPE OF REACTION

          AFTER HANDLING LATEX PRODUCTS SUCH AS RUBBER GLOVES, CONDOMS,

          DIAPHRAGMS, BALLOONS, SOCKS, OR UNDERWEAR?NO

          LATEX ALLERGY : HAVE YOU EVER DEVELOPED ANY TYPE OF REACTION

          DURING OR AFTER DENTAL APPOINTMENT, VAGINAL/RECTAL EXAMINATION,

          SURGICAL PROCEDURE, OR ANY OTHER EXPOSURE?NO

          LATEX RISK : HAVE YOU EVER HAD ANY DIFFICULTY BREATHING OR HIVES

          AFTER EATING OR HANDLING ANY FRUITS, OR VEGETABLES; SUCH AS KIWI,

          BANANAS, STONE FRUITS, OR CHESTNUTSNO

          LATEX RISK : DO YOU HAVE A PREVIOUS PERSONAL HISTORY OF MORE THAN

          NINE SURGERIES, SPINA BIFIDA, OR REPEATED CATHERIZATIONS? NO

          LATEX RISK : ARE YOU FREQUENTLY EXPOSED TO LATEX PRODUCTS IN YOUR

          OCCUPATION?NO

          DATE ASKED : 10/27/2020

           

           

          ALCOHOL SCREENING

          DID YOU HAVE A DRINK CONTAINING ALCOHOL IN THE PAST YEAR?YES

          HOW OFTEN DID YOU HAVE A DRINK CONTAINING ALCOHOL IN THE PAST

          YEAR?MONTHLY OR LESS (1 POINT)

          POINTS1

          INTERPRETATIONNEGATIVE

           

           

          RECREATIONAL DRUG USE

          DRUG USE?NO

           

           

          CAFFEINE

          CAFFEINE USE?YES

          HOW OFTEN AND HOW MUCH? 1 CUP PER DAY

           

           

          Yazidism

          [id number] NONE

           

           

          LANGUAGE

          LANGUAGES SPOKEN:ENGLISH

           

           

          LEARNING BARRIERS / SPECIAL NEEDS

          BARRIERS TO LEARNING?NO

          HEARING IMPAIRED?NO

          VISION IMPAIRED?YES

          COGNITIVELY IMPAIRED?NO

          :CORRECTIVE LENSES READING

          READINESS TO LEARN?YES

          LEARNING PREFERENCES?NO

          LEARNING CAPABILITIES PRESENT?YES

          EMOTIONAL BARRIERS?NO

          SPECIAL DEVICES?YES

          :CANE, OTHER KNEE AND ANKLE BRACE

           NEEDED?NO

           

           

          DOMESTIC VIOLENCE

          DO YOU FEEL SAFE IN YOUR ENVIRONMENT?YES

           

           

          DIET: CARBOHYDRATE CONTROLLED.

           

           

          MARITAL STATUS: .

           

           

          OTHERS AT HOME: CHILD 2 BOYS.

           

           

          PAIN CLINIC PFS, CLERGY, PUBLIC HEALTH REFERRALS

          PFS REFERRAL NEEDED?NO

          CLERGY REFERRAL NEEDED?NO

          PUBLIC HEALTH REFERRAL NEEDED?NO

          WAS THE PROVIDER NOTIFIED OF ANY PERTINENT INFO? N/A

          HAS THE PATIENT BEEN EDUCATED REGARDING HIS/HER PLAN OF CARE?YES

          HAS THE PATIENT BEEN EDUCATED REGARDING PAIN, THE RISK FOR PAIN,

          THE IMPORTANCE OF EFFECTIVE PAIN MANAGEMENT, AND THE PAIN

          ASSESSMENT PROCESS?YES

           

           

          ADVANCE DIRECTIVE

          ADVANCE DIRECTIVE DISCUSSED WITH PATIENT:YES PT HCP ALVARO TRUJILLO [phone]

           

HOSPITALIZATION/MAJOR DIAGNOSTIC PROCEDURE

           

          [facility] 2013

           

REVIEW OF SYSTEMS

           

      CONSTITUTIONAL:

           

          ANY RECENT FEVER    NO . CHILLS    NO . WEIGHT CHANGE OF UNKNOWN

          REASONS    NO .

           

      MUSCULOSKELETAL:

           

          ANY UNUSUAL JOINT PAIN OR SWELLING NOT MENTIONED    NO . SYSTEMIC

          LUPUS    NO . ANY NEUROMUSCULAR DISORDER NOT MENTIONED    NO .

          LYME DISEASE    NO .

           

      GASTROENTEROLOGY:

           

          ANY NEW CHANGE IN BOWEL CONTROL?    NO . HISTORY OF LIVER

          DISORDER NOT MENTIONED    NO . HISTORY OF UNUSUAL ABDOMINAL PAIN

          OR CRAMPING NOT MENTIONED    NO . NO CONSTIPATION.

           

      GENITOURINARY:

           

          ANY NEW CHANGE IN BLADDER CONTROL?    NO . ANY RENAL/KIDNEY

          CONDITON NOT MENTIONED    NO .

           

      NEUROLOGY:

           

          HISTORY OF TBI NOT MENTIONED    NO . OTHER NEW NUMBNESS OR PAIN

          PATTERNS NOT MENTIONED    NO . NEW ONSET DIZZINESS OR

          NEUROLOGICAL CHANGES NOT MENTIONED    NO . HISTORY OF SEVERE

          HEADACHES NOT MENTIONED    NO . HISTORY OF STROKE OR NEUROLOGICAL

          DISORDER NOT MENTIONED    NO .

           

      CARDIOLOGY:

           

          HEART SURGERY    NO . CONGESTIVE HEART FAILURE/FLUID OVERLOAD NOT

          MENTIONED    NO . HISTORY OF CHEST PAIN,IRREGULAR HEART BEAT NOT

          MENTIONED    NO .

           

      RESPIRATORY:

           

          SHORTNESS OF BREATH ON EXERTION, WHEEZES, UNUSUAL COUGH NOT

          MENTIONED    NO .

           

      ENDOCRINOLOGY:

           

          ADRENAL GLAND OR THYROID DISORDERS NOT MENTIONED    NO . UNUSUAL

          URINATION, DIZZINESS OR LETHARGY NOT MENTIONED    NO .

           

VITAL SIGNS

           

          .0 LBS, HT 71 IN, BMI 31.52 INDEX, /77 MM HG, HR 75

          /MIN, RR 18 /MIN, TEMP 97.1 F, OXYGEN SAT % 100%, SAFE IN ENV?

          (Y/N) Y, NA INITIALS AW 0823, REVIEWED BY: DS.

           

EXAMINATION

           

      GENERAL EXAMINATION:

          GENERALNO ACUTE DISTRESS, WELL NOURISHED AND HYDRATED.

           

          PSYCHAPPROPRIATE MOOD AND AFFECT .

           

          LUNGS:CLEAR TO AUSCULTATION BILATERALLY, NO WHEEZES, RHONCHI,

          RALES.

           

          HEART:NO MURMURS, REGULAR RATE AND RHYTHM.

           

          BACK:POINT TENDER LEFT SIDE LUMBAR SPINE, SURROUNDING SKIN SHOWS

          NO ERYTHEMA, ECCHYMOSIS, INCREASED WARMTH, AND/OR SKIN ERUPTIONS

          NOTED. .

           

          MUSCULOSKELETAL:EQUAL STRENGTH OF THE LOWER EXTREMITIES

          BILATERALLY .

           

ASSESSMENTS

           

          LEFT LATERAL KNEE PAIN - M25.562 (PRIMARY)

           

          LUMBAR SPONDYLOSIS - M47.816

           

TREATMENT

           

      LEFT LATERAL KNEE PAIN

          START DICLOFENAC SODIUM TABLET DELAYED RELEASE, 50 MG, 1 TABLET,

          ORALLY, THREE TIMES DAILY, 30 DAY(S), 90

          START BACLOFEN TABLET, 10 MG, 1 TABLET WITH FOOD OR MILK, ORALLY,

          THREE TIMES A DAY, 30 DAY(S), 90

          CLINICAL NOTES: INITIAL PAIN CONSULT. GIVEN PRESENTING SYMPTOMS

          AND RESULTS OF PHYSICAL EXAMINATION RECOMMEND NORCO 10/325 MG

          TWICE A DAY WHEN NECESSARY PAIN, DICLOFENAC SODIUM 50 MG 3 TIMES

          A DAY WHEN NECESSARY PAIN, AND BACLOFEN 10 MG 3 TIMES A DAY WHEN

          NECESSARY MUSCLE SPASMS WITH FOLLOW-UP IN ONE MONTH TO DETERMINE

          EFFICACY OF TREATMENT. PATIENT WILL SIGN NEW NARCOTIC AGREEMENT

          AND PERFORM U TOX TODAY. PATIENT HAS EXPRESSED UNDERSTANDING OF

          AND WAS IN AGREEMENT WITH TREATMENT PLAN. GIVEN TIME TO ASK

          QUESTIONS AND EXPRESS CONCERNS.

           

          , [employer] REGISTRY REVIEWED AND DEMONSTRATES COMPLLIANCE. (REF #

          [id number] ) BRINGS IN MEDICATIONS WHICH IS APPROPRIATE FOR WHAT

          WAS DISPENSED. RECENT URINE TOXICOLOGY REVIEWED. NO UNAUTHORIZED

          MEDICATIONS. NO ILLICIT SUBSTANCES AND PRESCRIBED MEDICATIONS

          WERE PRESENT.

           

PROCEDURE CODES

           

           ESTABILISHED PATIENT [facility] CHARGE

           

DISPOSITION & COMMUNICATION

           

FOLLOW UP

           

          4 WEEKS (REASON: BACK PAIN, NEW MEDICATIONS)

           

 

ELECTRONICALLY SIGNED BY VIRGINIE CARDENAS ON

          10/28/2020 AT 08:46 AM EDT

           

           

           

 

DISCLAIMER :

THIS IS A VISIT SUMMARY EXTRACTED FROM THE [employer] CHART.

IT IS NOT A COPY OF THE [employer] PROGRESS NOTE.

BRODY

## 2020-11-25 ENCOUNTER — HOSPITAL ENCOUNTER (OUTPATIENT)
Dept: HOSPITAL 53 - M PAIN | Age: 50
End: 2020-11-25
Attending: NURSE PRACTITIONER
Payer: MEDICARE

## 2020-11-25 DIAGNOSIS — G47.30: ICD-10-CM

## 2020-11-25 DIAGNOSIS — K21.9: ICD-10-CM

## 2020-11-25 DIAGNOSIS — J45.909: ICD-10-CM

## 2020-11-25 DIAGNOSIS — M79.7: ICD-10-CM

## 2020-11-25 DIAGNOSIS — Z79.891: ICD-10-CM

## 2020-11-25 DIAGNOSIS — M25.562: Primary | ICD-10-CM

## 2020-11-25 DIAGNOSIS — Z79.899: ICD-10-CM

## 2020-11-25 DIAGNOSIS — E11.9: ICD-10-CM

## 2020-11-25 DIAGNOSIS — M47.816: ICD-10-CM

## 2020-12-01 NOTE — ECWPNPC
PATIENT NAME: TOREY TRUJILLO

: 1970

GENDER: MALE

MRN: [id number]

VISIT DATE: 2020

DISCHARGE DATE: 20 0958

VISIT LOCKED DATE TIME: 

PHYSICIAN: JOSE G BLAND 

RESOURCE: JOSE G BLAND 

 

           

           

REASON FOR APPOINTMENT

           

          1. BACK PAIN

           

HISTORY OF PRESENT ILLNESS

           

      GENERAL:

           50-YEAR-OLD MALE IN FOR CHRONIC PAIN FOLLOW-UP. HE RATES HIS

          PAIN CURRENTLY AT A 6 OUT OF 10 AND DESCRIBES IT AS ACHING,

          SHARP, STABBING, THROBBING, AND SHOOTING. PATIENT FEELS

          MEDICATIONS ARE HELPFUL AND DENIES MED SIDE EFFECTS AT THIS TIME.

          HE DOES ADMIT TO STOMACH DISCOMFORT WITH THE BACLOFEN AND HAS

          REQUESTED A MED CHANGE.

           

      FALL RISK SCREENING:

      SCREENING

           

           

          :TWO OR MORE FALLS WITHOUT INJURY IN THE PAST YEAR

           

      PAIN SCREENING:

      PATIENT HAS A COMPLAINT OF ACUTE OR CHRONIC PAIN

           

           

          :YES

          LOCATION OF PAIN:UPPER BACK, MID BACK, LOW BACK, KNEES

          INTENSITY OF PAIN (SCALE OF 1 TO 10):6

          WHAT DOES YOUR PAIN FEEL LIKE:ACHING, SHARP, STABBING, THROBBING,

          SHOOTING

          DURATION:CONTINOUS, CONSTANT, STEADY, ALL DAY

          PAIN IS INCREASED BY:ACTIVITIES, PROLONGED STANDING, OTHERS

          WALKING ON UNEVEN GROUND, STAIRS, PROLONGED SITTING

          PAIN IS DECREASED BY:OTHERS HOT TUB, ICE, MASSAGE, YOGA

           

      NURSING NOTE:

           -.

           

      PAIN CENTER INTAKE QUESTIONS:

      DO YOU HAVE A HISTORY OF MRSA?

           

           

          :NO

           

      DO YOU TAKE A BLOOD THINNERS?

           

           

          :NO

           

      DO YOU HAVE ANY BLEEDING DISORDERS?

           

           

          :NO

           

      ANY NEW NUMBNESS OR WEAKNESS IN YOUR LEGS OR ARMS?

           

           

          :NO

           

      ANY PACEMAKER,DEFIBRILLATOR, OR DORSAL COLUMN

          STIMULATOR?

           

           

          :NO

           

      DO YOU HAVE ANY RASHES OR OPEN SORES?

           

           

          :NO

           

      ARE YOU ALLERGIC TO IV DYE?

           

           

          :NO

           

      ARE YOU DIABETIC?

           

           

          :YES

           

      ANY NEW PROBLEMS WITH YOUR MEDICATIONS?

           

           

          :YES STATES BACLOFEN MAKING HIS STOMACH UPSET

           

      HAVE YOU RECEIVED A VACCINE IN THE PAST 30 DAYS?

           

           

          :NO

           

      DO YOU PLAN TO RECEIVE A VACCINE IN THE NEXT 21

          DAYS?

           

           

          :NO

           

      DO YOU NEED ANY PRESCRIPTION?

           

           

          :YES DICLOFENAC, HYDROCODONE

           

      DO YOU TAKE ANY IMMUNOSUPPRESSIVE MEDICATIONS?

           

           

          :NO

           

      IS THERE A CHANCE YOU COULD BE PREGNANT?

           

           

          :NO

           

      ARE YOU BREAST FEEDING?

           

           

          :NO

           

CURRENT MEDICATIONS

           

          TAKING ALBUTEROL SULFATE 2 PUFFS INHALATION EVERY 4 HOURS AS

          NEEDED

          TAKING MULTIVITAMIN 1 TABLET ORALLY DAILY

          TAKING ASCORBIC ACID 600 MG CAPSULE ORALLY DAILY

          TAKING MAY HAVE - - CBD OIL PO AS DIRECTED

          TAKING VITAMIN D 2000 UNIT TABLET ORALLY DAILY

          TAKING ZYRTEC 10 MG 1 TAB ORAL DAILY

          TAKING LIDODERM 5 % PATCH 1-2 PATCH TO SKIN REMOVE AFTER 12 HOURS

          EXTERNALLY TO PAIN FULAREAS OF BACK, KNEE/SHOULDER AS NEEDED ON

          12, OFF 12

          TAKING DICLOFENAC SODIUM 50 MG TABLET DELAYED RELEASE 1 TABLET

          ORALLY THREE TIMES DAILY

          TAKING BACLOFEN 10 MG TABLET 1 TABLET WITH FOOD OR MILK ORALLY

          THREE TIMES A DAY

          TAKING HYDROCODONE-ACETAMINOPHEN  MG TABLET 1 TABLET AS

          NEEDED ORALLY BID PRN PAIN MDD=2

          NOT-TAKING FISH  MG CAPSULE ORALLY BID

          NOT-TAKING BACLOFEN 10 MG TABLET 1 TABLET WITH FOOD OR MILK

          ORALLY THREE TIMES A DAY

          NOT-TAKING ARTHROTEC 75-0.2 MG TABLET DELAYED RELEASE 1 TABLET

          WITH FOOD ORALLY TWICE A DAY

          NOT-TAKING LISINOPRIL 10 10 MG TABLET ORAL DAILY

          MEDICATION LIST REVIEWED AND RECONCILED WITH THE PATIENT

           

PAST MEDICAL HISTORY

           

          ASTHMA

          SLEEP APNEA, USES CPAP

          HEARING LOSS

          DIABETES TYPE II

          REFLUX

          L KNEE ARTHRITIS

          BACK PAIN

          RIGHT KNEE PAIN

          LEFT ANKLE PAIN

          RIGHT SHOULDER PAIN

          FIBROMYALGIA

           

ALLERGIES

           

          ENVIRONMENTAL: CONGESTION - ALLERGY

           

SURGICAL HISTORY

           

          RIGHT SHOULDER RECONSTRUCTION/ ROTATOR CUFF REPAIR 

          LEFT ANKLE RECONSTRUCTION 

          RIGHT KNEE MENISCUS REPAIR 

          UP3 ( ORAL SURGERY) 

          RIGHT SHOULDER REPAIR 

           

FAMILY HISTORY

           

          FATHER:  68 YRS, DIAGNOSED WITH UNSPECIFIED HEART DISEASE

          MOTHER: ALIVE 64 YRS

          3 SISTER(S) - HEALTHY. 2 SON(S) - HEALTHY.

           

SOCIAL HISTORY

           

          GENERAL:

           

          TOBACCO USE

          ARE YOU A:NONSMOKER

           

           

          LATEX QUESTIONNAIRE

          LATEX ALLERGY : HAVE YOU EVER DEVELOPED ANY TYPE OF REACTION

          AFTER HANDLING LATEX PRODUCTS SUCH AS RUBBER GLOVES, CONDOMS,

          DIAPHRAGMS, BALLOONS, SOCKS, OR UNDERWEAR?NO

          LATEX ALLERGY : HAVE YOU EVER DEVELOPED ANY TYPE OF REACTION

          DURING OR AFTER DENTAL APPOINTMENT, VAGINAL/RECTAL EXAMINATION,

          SURGICAL PROCEDURE, OR ANY OTHER EXPOSURE?NO

          LATEX RISK : HAVE YOU EVER HAD ANY DIFFICULTY BREATHING OR HIVES

          AFTER EATING OR HANDLING ANY FRUITS, OR VEGETABLES; SUCH AS KIWI,

          BANANAS, STONE FRUITS, OR CHESTNUTSNO

          LATEX RISK : DO YOU HAVE A PREVIOUS PERSONAL HISTORY OF MORE THAN

          NINE SURGERIES, SPINA BIFIDA, OR REPEATED CATHERIZATIONS? NO

          LATEX RISK : ARE YOU FREQUENTLY EXPOSED TO LATEX PRODUCTS IN YOUR

          OCCUPATION?NO

          DATE ASKED : 10/27/2020

           

           

          ALCOHOL SCREENING

          DID YOU HAVE A DRINK CONTAINING ALCOHOL IN THE PAST YEAR?YES

          HOW OFTEN DID YOU HAVE A DRINK CONTAINING ALCOHOL IN THE PAST

          YEAR?MONTHLY OR LESS (1 POINT)

          POINTS1

          INTERPRETATIONNEGATIVE

           

           

          RECREATIONAL DRUG USE

          DRUG USE?NO

           

           

          CAFFEINE

          CAFFEINE USE?YES

          HOW OFTEN AND HOW MUCH? 1 CUP PER DAY

           

           

          Nondenominational

          [id number] NONE

           

           

          LANGUAGE

          LANGUAGES SPOKEN:ENGLISH

           

           

          LEARNING BARRIERS / SPECIAL NEEDS

          CHANGE FROM LAST VISIT?NO

          BARRIERS TO LEARNING?NO

          HEARING IMPAIRED?YES TINNITIS, SLIGHT HEARING LOSS BILATERALLY

          VISION IMPAIRED?YES

          :CORRECTIVE LENSES READING

          COGNITIVELY IMPAIRED?NO

          READINESS TO LEARN?YES

          LEARNING PREFERENCES?NO

          LEARNING CAPABILITIES PRESENT?YES

          EMOTIONAL BARRIERS?NO

          SPECIAL DEVICES?YES

          :CANE, OTHER KNEE AND ANKLE BRACE

           NEEDED?NO

           

           

          DOMESTIC VIOLENCE

          DO YOU FEEL SAFE IN YOUR ENVIRONMENT?YES

           

           

          DIET: CARBOHYDRATE CONTROLLED.

           

           

          MARITAL STATUS: .

           

           

          OTHERS AT HOME: CHILD 2 BOYS.

           

           

          PAIN CLINIC PFS, CLERGY, PUBLIC HEALTH REFERRALS

          PFS REFERRAL NEEDED?NO

          CLERGY REFERRAL NEEDED?NO

          PUBLIC HEALTH REFERRAL NEEDED?NO

          WAS THE PROVIDER NOTIFIED OF ANY PERTINENT INFO? N/A

          HAS THE PATIENT BEEN EDUCATED REGARDING HIS/HER PLAN OF CARE?YES

          HAS THE PATIENT BEEN EDUCATED REGARDING PAIN, THE RISK FOR PAIN,

          THE IMPORTANCE OF EFFECTIVE PAIN MANAGEMENT, AND THE PAIN

          ASSESSMENT PROCESS?YES

           

           

          ADVANCE DIRECTIVE

          ADVANCE DIRECTIVE DISCUSSED WITH PATIENT:YES PT HCP ALVARO TRUJILLO [phone]

           

HOSPITALIZATION/MAJOR DIAGNOSTIC PROCEDURE

           

          [facility] 2013

           

REVIEW OF SYSTEMS

           

      CONSTITUTIONAL:

           

          ANY RECENT FEVER    NO . CHILLS    NO . WEIGHT CHANGE OF UNKNOWN

          REASONS    NO .

           

      GASTROENTEROLOGY:

           

          NEW UNEXPLAINABLE CHANGES IN BOWEL CONTROL    NO . CONSTIPATION  

           NO .

           

      GENITOURINARY:

           

          ANY NEW CHANGE IN BLADDER CONTROL?    NO .

           

      NEUROLOGY:

           

          NEW ONSET DIZZINESS OR NEUROLOGICAL CHANGES NOT MENTIONED    NO .

          NEW NUMBNESS OR PAIN PATTERNS NOT MENTIONED AND PERTINENT TO

          TODAY'S VISIT    NO .

           

      CARDIOLOGY:

           

          NEW CHEST PRESSURE    NO . NEW CHEST PAIN    NO .

           

      RESPIRATORY:

           

          UNEXPLAINABLE COUGH    NO . NEW SHORTNESS OF BREATH    NO .

           

VITAL SIGNS

           

          .8 LBS, HT 71 IN, BMI 31.49 INDEX, /85 MM HG, HR 87

          /MIN, RR 18 /MIN, TEMP 97.5 F, OXYGEN SAT % 98%, SAFE IN ENV?

          (Y/N) Y, NA INITIALS AW 0914, REVIEWED BY: SAMUEL CLARK RN.

           

EXAMINATION

           

      GENERAL EXAMINATION:

          GENERALNO ACUTE DISTRESS, WELL NOURISHED AND HYDRATED.

           

          PSYCHAPPROPRIATE MOOD AND AFFECT .

           

          LUNGS:CLEAR TO AUSCULTATION BILATERALLY, NO WHEEZES, RHONCHI,

          RALES.

           

          HEART:NO MURMURS, REGULAR RATE AND RHYTHM.

           

ASSESSMENTS

           

          LEFT LATERAL KNEE PAIN - M25.562 (PRIMARY)

           

          LUMBAR SPONDYLOSIS - M47.816

           

TREATMENT

           

      LEFT LATERAL KNEE PAIN

          STOP BACLOFEN TABLET, 10 MG, 1 TABLET WITH FOOD OR MILK, ORALLY,

          THREE TIMES A DAY, 30 DAY(S), 90

          INCREASE HYDROCODONE-ACETAMINOPHEN TABLET,  MG, 1 TABLET AS

          NEEDED, ORALLY, TID PRN PAIN MDD=3, 30 DAYS, 90

          START SKELAXIN TABLET, 800 MG, 1 TABLET, ORALLY, THREE TIMES A

          DAY, 30 DAY(S), 90

          REFILL DICLOFENAC SODIUM TABLET DELAYED RELEASE, 50 MG, 1 TABLET,

          ORALLY, THREE TIMES DAILY, 30 DAY(S), 90

          NOTES: 50-YEAR-OLD MALE IN FOR CHRONIC PAIN FOLLOW-UP. GIVEN

          PRESENTING SYMPTOMS RECOMMEND STOPPING BACLOFEN AND STARTING

          SKELAXIN, FURTHER RECOMMENDED INCREASING NORCO TO 3 TIMES A DAY

          DOSING WITH FOLLOW-UP IN ONE MONTH TO DETERMINE EFFICACY OF

          TREATMENT. PATIENT HAS EXPRESSED UNDERSTANDING OF AND WAS IN

          AGREEMENT WITH TREATMENT PLAN. GIVEN TIME TO ASK QUESTIONS AND

          EXPRESS CONCERNS.

           

          , [employer] REGISTRY REVIEWED AND DEMONSTRATES COMPLLIANCE. (REF #

          [id number] ) BRINGS IN MEDICATIONS WHICH IS APPROPRIATE FOR WHAT

          WAS DISPENSED. RECENT URINE TOXICOLOGY REVIEWED. NO UNAUTHORIZED

          MEDICATIONS. NO ILLICIT SUBSTANCES AND PRESCRIBED MEDICATIONS

          WERE PRESENT.

           

          PRINTED AND REVIEWED INFORMATION ON NEW MEDICATION, SKELAXIN,

          WITH PATIENT. ILAN CLARK RN.

           

PROCEDURE CODES

           

           ESTABILISHED PATIENT [facility] CHARGE

           

DISPOSITION & COMMUNICATION

           

FOLLOW UP

           

          4 WEEKS (REASON: MED INCREASE)

           

 

ELECTRONICALLY SIGNED BY VIRGINIE CARDENAS ON

          2020 AT 08:31 AM EST

           

           

           

 

DISCLAIMER :

THIS IS A VISIT SUMMARY EXTRACTED FROM THE [facility] CHART.

IT IS NOT A COPY OF THE [facility] PROGRESS NOTE.

[facility]

## 2020-12-29 ENCOUNTER — HOSPITAL ENCOUNTER (OUTPATIENT)
Dept: HOSPITAL 53 - M PAIN | Age: 50
End: 2020-12-29
Attending: NURSE PRACTITIONER
Payer: MEDICARE

## 2020-12-29 DIAGNOSIS — M79.7: ICD-10-CM

## 2020-12-29 DIAGNOSIS — Z79.899: ICD-10-CM

## 2020-12-29 DIAGNOSIS — M47.816: ICD-10-CM

## 2020-12-29 DIAGNOSIS — J45.909: ICD-10-CM

## 2020-12-29 DIAGNOSIS — M25.562: Primary | ICD-10-CM

## 2020-12-29 DIAGNOSIS — G47.30: ICD-10-CM

## 2020-12-29 DIAGNOSIS — G89.29: ICD-10-CM

## 2020-12-29 DIAGNOSIS — E11.9: ICD-10-CM

## 2020-12-31 NOTE — ECWPNPC
PATIENT NAME: TOREY TRUJILLO

: 1970

GENDER: MALE

MRN: [id number]

VISIT DATE: 2020

DISCHARGE DATE: 20 1003

VISIT LOCKED DATE TIME: 

PHYSICIAN: JOSE G BLAND 

RESOURCE: JOSE G BLAND 

 

           

           

REASON FOR APPOINTMENT

           

          1. BACK PAIN

           

HISTORY OF PRESENT ILLNESS

           

      DEPRESSION SCREENING:

      PHQ-2 (2015 EDITION)

           

           

          LITTLE INTEREST OR PLEASURE IN DOING THINGS?NOT AT ALL

          FEELING DOWN, DEPRESSED, OR HOPELESS?NOT AT ALL

          TOTAL SCORE0

           

           50-YEAR-OLD MALE IN FOR CHRONIC PAIN FOLLOW-UP. AT LAST CLINIC

          VISIT PATIENT'S HYDROCODONE WAS INCREASED TO 3 TIMES A DAY DOSING

          AND HE ADMITS TODAY THAT THIS HAS BEEN BENEFICIAL. HE DOES ADMIT

          TO CONTINUED BREAKTHROUGH PAIN HOWEVER TO INCLUDE RADICULAR

          SYMPTOMS.

           

      FALL RISK SCREENING:

      SCREENING

           

           

          :TWO OR MORE FALLS WITHOUT INJURY IN THE PAST YEAR

           

      PAIN SCREENING:

      PATIENT HAS A COMPLAINT OF ACUTE OR CHRONIC PAIN

           

           

          :YES

          LOCATION OF PAIN:RIGHT SHOULDER, UPPER BACK, MID BACK, LOW BACK,

          KNEES, ANKLE(S)

          INTENSITY OF PAIN (SCALE OF 1 TO 10):6

          WHAT DOES YOUR PAIN FEEL LIKE:ACHING, SHARP, STABBING, THROBBING,

          SHOOTING

          DURATION:CONTINOUS, CONSTANT, STEADY, ALL DAY

          PAIN IS INCREASED BY:ACTIVITIES, PROLONGED STANDING, OTHERS

          WALKING ON UNEVEN GROUND, STAIRS, PROLONGED SITTING

          PAIN IS DECREASED BY:OTHERS HOT TUB, ICE, MASSAGE, YOGA

           

      NURSING NOTE:

           -.

           

      PAIN CENTER INTAKE QUESTIONS:

      DO YOU HAVE A HISTORY OF MRSA?

           

           

          :NO

           

      DO YOU TAKE A BLOOD THINNERS?

           

           

          :NO

           

      DO YOU HAVE ANY BLEEDING DISORDERS?

           

           

          :NO

           

      ANY NEW NUMBNESS OR WEAKNESS IN YOUR LEGS OR ARMS?

           

           

          :NO

           

      ANY PACEMAKER,DEFIBRILLATOR, OR DORSAL COLUMN

          STIMULATOR?

           

           

          :NO

           

      DO YOU HAVE ANY RASHES OR OPEN SORES?

           

           

          :NO

           

      ARE YOU ALLERGIC TO IV DYE?

           

           

          :NO

           

      ARE YOU DIABETIC?

           

           

          :YES

           

      ANY NEW PROBLEMS WITH YOUR MEDICATIONS?

           

           

          :NO

           

      HAVE YOU RECEIVED A VACCINE IN THE PAST 30 DAYS?

           

           

          :NO

           

      DO YOU PLAN TO RECEIVE A VACCINE IN THE NEXT 21

          DAYS?

           

           

          :NO

           

      DO YOU NEED ANY PRESCRIPTION?

           

           

          :YES DICLOFENAC, HYDROCODONE

           

      DO YOU TAKE ANY IMMUNOSUPPRESSIVE MEDICATIONS?

           

           

          :NO

           

      IS THERE A CHANCE YOU COULD BE PREGNANT?

           

           

          :NO

           

      ARE YOU BREAST FEEDING?

           

           

          :NO

           

CURRENT MEDICATIONS

           

          TAKING ALBUTEROL SULFATE 2 PUFFS INHALATION EVERY 4 HOURS AS

          NEEDED

          TAKING MULTIVITAMIN 1 TABLET ORALLY DAILY

          TAKING ASCORBIC ACID 600 MG CAPSULE ORALLY DAILY

          TAKING MAY HAVE - - CBD OIL PO AS DIRECTED

          TAKING VITAMIN D 2000 UNIT TABLET ORALLY DAILY

          TAKING ZYRTEC 10 MG 1 TAB ORAL DAILY

          TAKING HYDROCODONE-ACETAMINOPHEN  MG TABLET 1 TABLET AS

          NEEDED ORALLY TID PRN PAIN MDD=3

          TAKING SKELAXIN 800 MG TABLET 1 TABLET ORALLY THREE TIMES A DAY

          TAKING DICLOFENAC SODIUM 50 MG TABLET DELAYED RELEASE 1 TABLET

          ORALLY THREE TIMES DAILY

          NOT-TAKING LIDODERM 5 % PATCH 1-2 PATCH TO SKIN REMOVE AFTER 12

          HOURS EXTERNALLY TO PAIN FULAREAS OF BACK, KNEE/SHOULDER AS

          NEEDED ON 12, OFF 12

          NOT-TAKING FISH  MG CAPSULE ORALLY BID

          NOT-TAKING BACLOFEN 10 MG TABLET 1 TABLET WITH FOOD OR MILK

          ORALLY THREE TIMES A DAY

          NOT-TAKING ARTHROTEC 75-0.2 MG TABLET DELAYED RELEASE 1 TABLET

          WITH FOOD ORALLY TWICE A DAY

          NOT-TAKING LISINOPRIL 10 10 MG TABLET ORAL DAILY

          MEDICATION LIST REVIEWED AND RECONCILED WITH THE PATIENT

           

PAST MEDICAL HISTORY

           

          ASTHMA

          SLEEP APNEA, USES CPAP

          HEARING LOSS

          DIABETES TYPE II

          REFLUX

          L KNEE ARTHRITIS

          BACK PAIN

          RIGHT KNEE PAIN

          LEFT ANKLE PAIN

          RIGHT SHOULDER PAIN

          FIBROMYALGIA

           

ALLERGIES

           

          ENVIRONMENTAL: CONGESTION - ALLERGY

           

SURGICAL HISTORY

           

          RIGHT SHOULDER RECONSTRUCTION/ ROTATOR CUFF REPAIR 

          LEFT ANKLE RECONSTRUCTION 

          RIGHT KNEE MENISCUS REPAIR 

          UP3 ( ORAL SURGERY) 

          RIGHT SHOULDER REPAIR 

           

FAMILY HISTORY

           

          FATHER:  68 YRS, DIAGNOSED WITH UNSPECIFIED HEART DISEASE

          MOTHER: ALIVE 64 YRS

          3 SISTER(S) - HEALTHY. 2 SON(S) - HEALTHY.

           

SOCIAL HISTORY

           

          GENERAL:

           

          TOBACCO USE

          ARE YOU A:NONSMOKER

           

           

          LATEX QUESTIONNAIRE

          LATEX ALLERGY : HAVE YOU EVER DEVELOPED ANY TYPE OF REACTION

          AFTER HANDLING LATEX PRODUCTS SUCH AS RUBBER GLOVES, CONDOMS,

          DIAPHRAGMS, BALLOONS, SOCKS, OR UNDERWEAR?NO

          LATEX ALLERGY : HAVE YOU EVER DEVELOPED ANY TYPE OF REACTION

          DURING OR AFTER DENTAL APPOINTMENT, VAGINAL/RECTAL EXAMINATION,

          SURGICAL PROCEDURE, OR ANY OTHER EXPOSURE?NO

          DATE ASKED : 10/27/2020

          LATEX RISK : HAVE YOU EVER HAD ANY DIFFICULTY BREATHING OR HIVES

          AFTER EATING OR HANDLING ANY FRUITS, OR VEGETABLES; SUCH AS KIWI,

          BANANAS, STONE FRUITS, OR CHESTNUTSNO

          LATEX RISK : DO YOU HAVE A PREVIOUS PERSONAL HISTORY OF MORE THAN

          NINE SURGERIES, SPINA BIFIDA, OR REPEATED CATHERIZATIONS? NO

          LATEX RISK : ARE YOU FREQUENTLY EXPOSED TO LATEX PRODUCTS IN YOUR

          OCCUPATION?NO

           

           

          ALCOHOL SCREENING

          DID YOU HAVE A DRINK CONTAINING ALCOHOL IN THE PAST YEAR?YES

          HOW OFTEN DID YOU HAVE A DRINK CONTAINING ALCOHOL IN THE PAST

          YEAR?MONTHLY OR LESS (1 POINT)

          POINTS1

          INTERPRETATIONNEGATIVE

           

           

          RECREATIONAL DRUG USE

          DRUG USE?NO

           

           

          CAFFEINE

          CAFFEINE USE?YES

          HOW OFTEN AND HOW MUCH? 1 CUP PER DAY

           

           

          Jainism

          [id number] NONE

           

           

          LANGUAGE

          LANGUAGES SPOKEN:ENGLISH

           

           

          LEARNING BARRIERS / SPECIAL NEEDS

          CHANGE FROM LAST VISIT?NO

          BARRIERS TO LEARNING?NO

          HEARING IMPAIRED?YES TINNITIS, SLIGHT HEARING LOSS BILATERALLY

          VISION IMPAIRED?YES

          COGNITIVELY IMPAIRED?NO

          :CORRECTIVE LENSES READING

          READINESS TO LEARN?YES

          LEARNING PREFERENCES?NO

          LEARNING CAPABILITIES PRESENT?YES

          EMOTIONAL BARRIERS?NO

          SPECIAL DEVICES?YES

          :CANE, OTHER KNEE AND ANKLE BRACE

           NEEDED?NO

           

           

          DOMESTIC VIOLENCE

          DO YOU FEEL SAFE IN YOUR ENVIRONMENT?YES

           

           

          DIET: CARBOHYDRATE CONTROLLED.

           

           

          MARITAL STATUS: .

           

           

          OTHERS AT HOME: CHILD 2 BOYS.

           

           

          PAIN CLINIC PFS, CLERGY, PUBLIC HEALTH REFERRALS

          PFS REFERRAL NEEDED?NO

          CLERGY REFERRAL NEEDED?NO

          PUBLIC HEALTH REFERRAL NEEDED?NO

          WAS THE PROVIDER NOTIFIED OF ANY PERTINENT INFO? N/A

          HAS THE PATIENT BEEN EDUCATED REGARDING HIS/HER PLAN OF CARE?YES

          HAS THE PATIENT BEEN EDUCATED REGARDING PAIN, THE RISK FOR PAIN,

          THE IMPORTANCE OF EFFECTIVE PAIN MANAGEMENT, AND THE PAIN

          ASSESSMENT PROCESS?YES

           

           

          ADVANCE DIRECTIVE

          ADVANCE DIRECTIVE DISCUSSED WITH PATIENT:YES PT HCP ALVARO TRUJILLO [phone]

           

HOSPITALIZATION/MAJOR DIAGNOSTIC PROCEDURE

           

          KETOACIDOSIS 2013

           

REVIEW OF SYSTEMS

           

      CONSTITUTIONAL:

           

          ANY RECENT FEVER    NO . CHILLS    NO . WEIGHT CHANGE OF UNKNOWN

          REASONS    NO .

           

      GASTROENTEROLOGY:

           

          NEW UNEXPLAINABLE CHANGES IN BOWEL CONTROL    NO . CONSTIPATION  

           NO .

           

      GENITOURINARY:

           

          ANY NEW CHANGE IN BLADDER CONTROL?    NO .

           

      NEUROLOGY:

           

          NEW ONSET DIZZINESS OR NEUROLOGICAL CHANGES NOT MENTIONED    NO .

          NEW NUMBNESS OR PAIN PATTERNS NOT MENTIONED AND PERTINENT TO

          TODAY'S VISIT    NO .

           

      CARDIOLOGY:

           

          NEW CHEST PRESSURE    NO . NEW CHEST PAIN    NO .

           

      RESPIRATORY:

           

          UNEXPLAINABLE COUGH    NO . NEW SHORTNESS OF BREATH    NO .

           

VITAL SIGNS

           

          .6 LBS, HT 71 IN, BMI 30.49 INDEX, /73 MM HG, 

          /MIN, RR 18 /MIN, TEMP 97.0 F, OXYGEN SAT % 98%, SAFE IN ENV?

          (Y/N) YES, NA INITIALS SC 09:13, REVIEWED BY: CHRIS WILLIAM.

           

EXAMINATION

           

      GENERAL EXAMINATION:

          GENERALNO ACUTE DISTRESS, WELL NOURISHED AND HYDRATED.

           

          PSYCHAPPROPRIATE MOOD AND AFFECT .

           

          LUNGS:CLEAR TO AUSCULTATION BILATERALLY, NO WHEEZES, RHONCHI,

          RALES.

           

          HEART:NO MURMURS, REGULAR RATE AND RHYTHM.

           

ASSESSMENTS

           

          LEFT LATERAL KNEE PAIN - M25.562 (PRIMARY)

           

          LUMBAR SPONDYLOSIS - M47.816

           

TREATMENT

           

      LEFT LATERAL KNEE PAIN

          LAB: PAIN CENTER URINE TOX (SEND OUT)

           

          ALBA ANDRES 2020 10:32:49 AM > UTOX OBTAINED AND SENT

          2020 [facility]

           

           

          NOTES:

           

          50-YEAR-OLD MALE IN FOR CHRONIC PAIN FOLLOW-UP. GIVEN PRESENTING

          SYMPTOMS RECOMMEND STARTING LYRICA 75 MG TWICE A DAY WITH

          FOLLOW-UP IN ONE MONTH TO DETERMINE EFFICACY OF TREATMENT.

          PATIENT HAS EXPRESSED UNDERSTANDING OF AND WAS IN AGREEMENT WITH

          TREATMENT PLAN. GIVEN TIME TO ASK QUESTIONS AND EXPRESS CONCERNS.

           

          , ISTOP REGISTRY REVIEWED AND DEMONSTRATES COMPLLIANCE. (REF #

          [id number] ) BRINGS IN MEDICATIONS WHICH IS APPROPRIATE FOR WHAT

          WAS DISPENSED. RECENT URINE TOXICOLOGY REVIEWED. NO UNAUTHORIZED

          MEDICATIONS. NO ILLICIT SUBSTANCES AND PRESCRIBED MEDICATIONS

          WERE PRESENT.

           

          .

           

           

      LUMBAR SPONDYLOSIS

          LAB: PAIN CENTER URINE TOX (SEND OUT)

           

          ALBA ANDRES 2020 10:32:49 AM > UTOX OBTAINED AND SENT

          2020 [facility]

           

           

      OTHERS

          START LYRICA CAPSULE, 75 MG, 1 CAPSULE, ORALLY, TWICE DAILY, 30

          DAY(S), 60

           

PROCEDURE CODES

           

           ESTABILISHED PATIENT [facility] FACILITY CHARGE

           

DISPOSITION & COMMUNICATION

           

FOLLOW UP

           

          4 WEEKS (REASON: NEW MEDICATION)

           

 

ELECTRONICALLY SIGNED BY VIRGINIE CARDENAS ON

          2020 AT 09:10 AM EST

           

           

           

 

DISCLAIMER :

THIS IS A VISIT SUMMARY EXTRACTED FROM THE [employer] CHART.

IT IS NOT A COPY OF THE [employer] PROGRESS NOTE.

MTDD

## 2021-01-19 ENCOUNTER — HOSPITAL ENCOUNTER (OUTPATIENT)
Dept: HOSPITAL 53 - M PAIN | Age: 51
End: 2021-01-19
Attending: NURSE PRACTITIONER
Payer: MEDICARE

## 2021-01-19 DIAGNOSIS — Z79.899: ICD-10-CM

## 2021-01-19 DIAGNOSIS — M47.816: Primary | ICD-10-CM

## 2021-01-19 DIAGNOSIS — E11.9: ICD-10-CM

## 2021-01-19 DIAGNOSIS — M25.562: ICD-10-CM

## 2021-01-19 DIAGNOSIS — J45.909: ICD-10-CM

## 2021-01-19 DIAGNOSIS — G47.30: ICD-10-CM

## 2021-01-19 DIAGNOSIS — Z79.891: ICD-10-CM

## 2021-01-19 DIAGNOSIS — M79.7: ICD-10-CM

## 2021-01-19 DIAGNOSIS — K21.9: ICD-10-CM

## 2021-01-21 NOTE — ECWPNPC
PATIENT NAME: TOREY TRUJILLO

: 1970

GENDER: MALE

MRN: [id number]

VISIT DATE: 2021

DISCHARGE DATE: 21 1036

VISIT LOCKED DATE TIME: 

PHYSICIAN: JOSE G BLAND 

RESOURCE: JOSE G BLAND 

 

           

           

REASON FOR APPOINTMENT

           

          1. NEW MEDICATION

           

HISTORY OF PRESENT ILLNESS

           

      GENERAL:

           - 50-YEAR-OLD MALE IN FOR CHRONIC PAIN FOLLOW-UP. AT LAST CLINIC

          VISIT PATIENT WAS STARTED ON LYRICA AND HE ADMITS TODAY THAT THIS

          HAS BEEN BENEFICIAL HOWEVER HE STILL EXPERIENCES BREAKTHROUGH

          PAIN. HE RATES HIS PAIN CURRENTLY AT A 6 OUT OF 10 AND DESCRIBES

          IT AS THROBBING, AND SHOOTING.

           

      FALL RISK SCREENING:

      SCREENING

           

           

          :NO FALLS REPORTED IN THE LAST YEAR

           

      PAIN SCREENING:

      PATIENT HAS A COMPLAINT OF ACUTE OR CHRONIC PAIN

           

           

          :YES

          LOCATION OF PAIN:NECK, LEFT SHOULDER, RIGHT SHOULDER, BOTH

          SHOULDERS, UPPER BACK, MID BACK, LOW BACK, FEET

          INTENSITY OF PAIN (SCALE OF 1 TO 10):6

          WHAT DOES YOUR PAIN FEEL LIKE:THROBBING, SHOOTING

          DURATION:INTERMITTENT

          PAIN IS INCREASED BY:PROLONGED STANDING

          PAIN IS DECREASED BY:OTHERS YOGA, STRETCHING, HOT TUB

          LEVEL OF RELIEF FROM PAIN TREATMENTS IN THE PAST:50%

          PAIN HAS INTERFERED WITH THE FOLLOWING:BATHING/DRESSING, WALKING

          ABILITY, HOUSEWORK, SLEEP, TRANSPORTATION, TOILETING

           

      NURSING NOTE:

           -.

           

      PAIN CENTER INTAKE QUESTIONS:

      DO YOU HAVE A HISTORY OF MRSA?

           

           

          :NO

           

      DO YOU TAKE A BLOOD THINNERS?

           

           

          :NO

           

      DO YOU HAVE ANY BLEEDING DISORDERS?

           

           

          :NO

           

      ANY NEW NUMBNESS OR WEAKNESS IN YOUR LEGS OR ARMS?

           

           

          :NO

           

      ANY PACEMAKER,DEFIBRILLATOR, OR DORSAL COLUMN

          STIMULATOR?

           

           

          :NO

           

      DO YOU HAVE ANY RASHES OR OPEN SORES?

           

           

          :NO

           

      ARE YOU ALLERGIC TO IV DYE?

           

           

          :NO

           

      ARE YOU DIABETIC?

           

           

          :YES

           

      ANY NEW PROBLEMS WITH YOUR MEDICATIONS?

           

           

          :YES LAST VISIT SUPPOSED TO REFILL SKELAXIN, DICLOFENAC

           

      HAVE YOU RECEIVED A VACCINE IN THE PAST 30 DAYS?

           

           

          :YES

          IF SO WHAT VACCINE AND WHEN? COVID VACCINE 21, ALLERGY

          INJECTIONS LAST WEDNESDAY

           

      DO YOU PLAN TO RECEIVE A VACCINE IN THE NEXT 21

          DAYS?

           

           

          :YES

          IF SO WHAT VACCINE AND WHEN? COVID 21

           

      DO YOU NEED ANY PRESCRIPTION?

           

           

          :YES DICLOFENAC, SKELAXIN, LYRICA, VICODIN

           

      DO YOU TAKE ANY IMMUNOSUPPRESSIVE MEDICATIONS?

           

           

          :NO

           

      IS THERE A CHANCE YOU COULD BE PREGNANT?

           

           

          :NO

           

      ARE YOU BREAST FEEDING?

           

           

          :NO

           

CURRENT MEDICATIONS

           

          TAKING ALBUTEROL SULFATE 2 PUFFS INHALATION EVERY 4 HOURS AS

          NEEDED

          TAKING MULTIVITAMIN 1 TABLET ORALLY DAILY

          TAKING ASCORBIC ACID 600 MG CAPSULE ORALLY DAILY

          TAKING MAY HAVE - - CBD OIL PO AS DIRECTED

          TAKING VITAMIN D 2000 UNIT TABLET ORALLY DAILY

          TAKING ZYRTEC 10 MG 1 TAB ORAL DAILY

          TAKING HYDROCODONE-ACETAMINOPHEN  MG TABLET 1 TABLET AS

          NEEDED ORALLY TID PRN PAIN MDD=3

          TAKING SKELAXIN 800 MG TABLET 1 TABLET ORALLY THREE TIMES A DAY

          TAKING DICLOFENAC SODIUM 50 MG TABLET DELAYED RELEASE 1 TABLET

          ORALLY THREE TIMES DAILY

          TAKING LYRICA 75 MG CAPSULE 1 CAPSULE ORALLY TWICE DAILY

          NOT-TAKING LIDODERM 5 % PATCH 1-2 PATCH TO SKIN REMOVE AFTER 12

          HOURS EXTERNALLY TO PAIN FULAREAS OF BACK, KNEE/SHOULDER AS

          NEEDED ON 12, OFF 12

          NOT-TAKING FISH  MG CAPSULE ORALLY BID

          NOT-TAKING BACLOFEN 10 MG TABLET 1 TABLET WITH FOOD OR MILK

          ORALLY THREE TIMES A DAY

          NOT-TAKING ARTHROTEC 75-0.2 MG TABLET DELAYED RELEASE 1 TABLET

          WITH FOOD ORALLY TWICE A DAY

          NOT-TAKING LISINOPRIL 10 10 MG TABLET ORAL DAILY

          MEDICATION LIST REVIEWED AND RECONCILED WITH THE PATIENT

           

PAST MEDICAL HISTORY

           

          ASTHMA

          SLEEP APNEA, USES CPAP

          HEARING LOSS

          DIABETES TYPE II

          REFLUX

          L KNEE ARTHRITIS

          BACK PAIN

          RIGHT KNEE PAIN

          LEFT ANKLE PAIN

          RIGHT SHOULDER PAIN

          FIBROMYALGIA

           

ALLERGIES

           

          ENVIRONMENTAL: CONGESTION - ALLERGY

           

SOCIAL HISTORY

           

          GENERAL:

           

          TOBACCO USE

          ARE YOU A:NONSMOKER

           

           

          LATEX QUESTIONNAIRE

          LATEX ALLERGY : HAVE YOU EVER DEVELOPED ANY TYPE OF REACTION

          AFTER HANDLING LATEX PRODUCTS SUCH AS RUBBER GLOVES, CONDOMS,

          DIAPHRAGMS, BALLOONS, SOCKS, OR UNDERWEAR?NO

          LATEX ALLERGY : HAVE YOU EVER DEVELOPED ANY TYPE OF REACTION

          DURING OR AFTER DENTAL APPOINTMENT, VAGINAL/RECTAL EXAMINATION,

          SURGICAL PROCEDURE, OR ANY OTHER EXPOSURE?NO

          DATE ASKED : 10/27/2020

          LATEX RISK : HAVE YOU EVER HAD ANY DIFFICULTY BREATHING OR HIVES

          AFTER EATING OR HANDLING ANY FRUITS, OR VEGETABLES; SUCH AS KIWI,

          BANANAS, STONE FRUITS, OR CHESTNUTSNO

          LATEX RISK : DO YOU HAVE A PREVIOUS PERSONAL HISTORY OF MORE THAN

          NINE SURGERIES, SPINA BIFIDA, OR REPEATED CATHERIZATIONS? NO

          LATEX RISK : ARE YOU FREQUENTLY EXPOSED TO LATEX PRODUCTS IN YOUR

          OCCUPATION?NO

           

           

          ALCOHOL SCREENING

          DID YOU HAVE A DRINK CONTAINING ALCOHOL IN THE PAST YEAR?YES

          HOW OFTEN DID YOU HAVE A DRINK CONTAINING ALCOHOL IN THE PAST

          YEAR?MONTHLY OR LESS (1 POINT)

          POINTS1

          INTERPRETATIONNEGATIVE

           

           

          RECREATIONAL DRUG USE

          DRUG USE?NO

           

           

          CAFFEINE

          CAFFEINE USE?YES

          HOW OFTEN AND HOW MUCH? 1 CUP PER DAY

           

           

          Adventism

          [id number] NONE

           

           

          LANGUAGE

          LANGUAGES SPOKEN:ENGLISH

           

           

          LEARNING BARRIERS / SPECIAL NEEDS

          CHANGE FROM LAST VISIT?NO

          BARRIERS TO LEARNING?NO

          HEARING IMPAIRED?YES TINNITIS, SLIGHT HEARING LOSS BILATERALLY

          VISION IMPAIRED?YES

          COGNITIVELY IMPAIRED?NO

          :CORRECTIVE LENSES READING

          READINESS TO LEARN?YES

          LEARNING PREFERENCES?NO

          LEARNING CAPABILITIES PRESENT?YES

          EMOTIONAL BARRIERS?NO

          SPECIAL DEVICES?YES

          :CANE, OTHER KNEE AND ANKLE BRACE

           NEEDED?NO

           

           

          DOMESTIC VIOLENCE

          DO YOU FEEL SAFE IN YOUR ENVIRONMENT?YES

           

           

          DIET: CARBOHYDRATE CONTROLLED.

           

           

          MARITAL STATUS: .

           

           

          OTHERS AT HOME: CHILD 2 BOYS.

           

           

          PAIN CLINIC PFS, CLERGY, PUBLIC HEALTH REFERRALS

          PFS REFERRAL NEEDED?NO

          CLERGY REFERRAL NEEDED?NO

          PUBLIC HEALTH REFERRAL NEEDED?NO

          WAS THE PROVIDER NOTIFIED OF ANY PERTINENT INFO? N/A

          HAS THE PATIENT BEEN EDUCATED REGARDING HIS/HER PLAN OF CARE?YES

          HAS THE PATIENT BEEN EDUCATED REGARDING PAIN, THE RISK FOR PAIN,

          THE IMPORTANCE OF EFFECTIVE PAIN MANAGEMENT, AND THE PAIN

          ASSESSMENT PROCESS?YES

           

           

          ADVANCE DIRECTIVE

          ADVANCE DIRECTIVE DISCUSSED WITH PATIENT:YES PT HCP ALVARO TRUJILLO [phone]

           

REVIEW OF SYSTEMS

           

      CONSTITUTIONAL:

           

          ANY RECENT FEVER    NO . CHILLS    NO . WEIGHT CHANGE OF UNKNOWN

          REASONS    NO .

           

      GASTROENTEROLOGY:

           

          NEW UNEXPLAINABLE CHANGES IN BOWEL CONTROL    NO . CONSTIPATION  

           NO .

           

      GENITOURINARY:

           

          ANY NEW CHANGE IN BLADDER CONTROL?    NO .

           

      NEUROLOGY:

           

          NEW ONSET DIZZINESS OR NEUROLOGICAL CHANGES NOT MENTIONED    NO .

          NEW NUMBNESS OR PAIN PATTERNS NOT MENTIONED AND PERTINENT TO

          TODAY'S VISIT    NO .

           

      CARDIOLOGY:

           

          NEW CHEST PRESSURE    NO . NEW CHEST PAIN    NO .

           

      RESPIRATORY:

           

          UNEXPLAINABLE COUGH    NO . NEW SHORTNESS OF BREATH    NO .

           

VITAL SIGNS

           

           LBS, HT 71 IN, BMI 30.26 INDEX, /80 MM HG, 

          /MIN, RR 16 /MIN, TEMP 97.8 F, OXYGEN SAT % 98, SAFE IN ENV?

          (Y/N) Y, REVIEWED BY: EM.

           

EXAMINATION

           

      GENERAL EXAMINATION:

          GENERALNO ACUTE DISTRESS, WELL NOURISHED AND HYDRATED.

           

          PSYCHAPPROPRIATE MOOD AND AFFECT .

           

          LUNGS:CLEAR TO AUSCULTATION BILATERALLY, NO WHEEZES, RHONCHI,

          RALES.

           

          HEART:NO MURMURS, REGULAR RATE AND RHYTHM.

           

ASSESSMENTS

           

          LUMBAR SPONDYLOSIS - M47.816

           

          LEFT LATERAL KNEE PAIN - M25.562

           

TREATMENT

           

      OTHERS

          REFILL SKELAXIN TABLET, 800 MG, 1 TABLET, ORALLY, THREE TIMES A

          DAY, 30 DAY(S), 90, REFILLS 2

          REFILL DICLOFENAC SODIUM TABLET DELAYED RELEASE, 50 MG, 1 TABLET,

          ORALLY, THREE TIMES DAILY, 30 DAY(S), 90, REFILLS 2

          NOTES: 50-YEAR-OLD MALE IN FOR CHRONIC PAIN FOLLOW-UP. GIVEN

          PRESENTING SYMPTOMS RECOMMEND INCREASING LYRICA  MG 3 TIMES

          A DAY WITH FOLLOW-UP IN 2 MONTHS. PATIENT HAS EXPRESSED

          UNDERSTANDING OF AND WAS IN AGREEMENT WITH TREATMENT PLAN. GIVEN

          TIME TO ASK QUESTIONS AND EXPRESS CONCERNS.

           

          , ISTOP REGISTRY REVIEWED AND DEMONSTRATES COMPLLIANCE. (REF #

          [id number] ) BRINGS IN MEDICATIONS WHICH IS APPROPRIATE FOR WHAT

          WAS DISPENSED. RECENT URINE TOXICOLOGY REVIEWED. NO UNAUTHORIZED

          MEDICATIONS. NO ILLICIT SUBSTANCES AND PRESCRIBED MEDICATIONS

          WERE PRESENT.

           

PROCEDURE CODES

           

           ESTABILISHED PATIENT [facility] CHARGE

           

DISPOSITION & COMMUNICATION

           

FOLLOW UP

           

          2 MONTHS (REASON: LOW BACK PAIN )

           

 

ELECTRONICALLY SIGNED BY VIRGINIE CARDENAS ON

          2021 AT 09:09 AM EST

           

           

           

 

DISCLAIMER :

THIS IS A VISIT SUMMARY EXTRACTED FROM THE [employer] CHART.

IT IS NOT A COPY OF THE [employer] PROGRESS NOTE.

BRODY

## 2021-03-16 ENCOUNTER — HOSPITAL ENCOUNTER (OUTPATIENT)
Dept: HOSPITAL 53 - M PAIN | Age: 51
End: 2021-03-16
Attending: NURSE PRACTITIONER
Payer: MEDICARE

## 2021-03-16 DIAGNOSIS — M79.7: ICD-10-CM

## 2021-03-16 DIAGNOSIS — H91.93: ICD-10-CM

## 2021-03-16 DIAGNOSIS — G47.30: ICD-10-CM

## 2021-03-16 DIAGNOSIS — M25.562: ICD-10-CM

## 2021-03-16 DIAGNOSIS — Z79.899: ICD-10-CM

## 2021-03-16 DIAGNOSIS — E11.9: ICD-10-CM

## 2021-03-16 DIAGNOSIS — K21.9: ICD-10-CM

## 2021-03-16 DIAGNOSIS — J45.909: ICD-10-CM

## 2021-03-16 DIAGNOSIS — Z79.891: ICD-10-CM

## 2021-03-16 DIAGNOSIS — M47.816: Primary | ICD-10-CM

## 2021-03-18 NOTE — ECWPNPC
PATIENT NAME: TOREY TRUJILLO

: 1970

GENDER: MALE

MRN: [id number]

VISIT DATE: 2021

DISCHARGE DATE: 21 1002

VISIT LOCKED DATE TIME: 

PHYSICIAN: JOSE G BLAND 

RESOURCE: JOSE G BLAND 

 

           

           

REASON FOR APPOINTMENT

           

          1. BACK

           

HISTORY OF PRESENT ILLNESS

           

      GENERAL:

           - 50-YEAR-OLD MALE IN FOR CHRONIC PAIN FOLLOW-UP. AT LAST CLINIC

          VISIT PATIENT'S LYRICA WAS INCREASED AND HE FEELS THIS WAS

          BENEFICIAL HOWEVER HE IS STILL EXPERIENCING INCREASED PAIN RATING

          IT AT A 7 OUT OF 10 TODAY.

           

      FALL RISK SCREENING:

      SCREENING

          : NO FALLS REPORTED IN THE LAST YEAR.

           

      PAIN SCREENING:

      PATIENT HAS A COMPLAINT OF ACUTE OR CHRONIC PAIN

           

           

          :NO

           

      NURSING NOTE:

           -.

           

      PAIN CENTER INTAKE QUESTIONS:

      DO YOU HAVE A HISTORY OF MRSA?

           

           

          :NO

           

      DO YOU TAKE A BLOOD THINNERS?

           

           

          :NO

           

      DO YOU HAVE ANY BLEEDING DISORDERS?

           

           

          :NO

           

      ANY NEW NUMBNESS OR WEAKNESS IN YOUR LEGS OR ARMS?

           

           

          :NO

           

      ANY PACEMAKER,DEFIBRILLATOR, OR DORSAL COLUMN

          STIMULATOR?

           

           

          :NO

           

      DO YOU HAVE ANY RASHES OR OPEN SORES?

           

           

          :NO

           

      ARE YOU ALLERGIC TO IV DYE?

           

           

          :NO

           

      ARE YOU DIABETIC?

           

           

          :YES TYPE II

           

      ANY NEW PROBLEMS WITH YOUR MEDICATIONS?

           

           

          :NO

           

      HAVE YOU RECEIVED A VACCINE IN THE PAST 30 DAYS?

           

           

          :NO

           

      DO YOU PLAN TO RECEIVE A VACCINE IN THE NEXT 21

          DAYS?

           

           

          :NO

           

      DO YOU NEED ANY PRESCRIPTION?

           

           

          :YES VICODIN

           

      DO YOU TAKE ANY IMMUNOSUPPRESSIVE MEDICATIONS?

           

           

          :NO

           

      DO YOU HAVE ANY KIDNEY OR LIVER DISEASE?

           

           

          :NO

           

      IS THERE A CHANCE YOU COULD BE PREGNANT?

           

           

          :NO

           

      ARE YOU BREAST FEEDING?

           

           

          :NO

           

CURRENT MEDICATIONS

           

          TAKING ALBUTEROL SULFATE 2 PUFFS INHALATION EVERY 4 HOURS AS

          NEEDED

          TAKING MULTIVITAMIN 1 TABLET ORALLY DAILY

          TAKING ASCORBIC ACID 600 MG CAPSULE ORALLY DAILY

          TAKING MAY HAVE - - CBD OIL PO AS DIRECTED

          TAKING VITAMIN D 2000 UNIT TABLET ORALLY DAILY

          TAKING ZYRTEC 10 MG 1 TAB ORAL DAILY

          TAKING HYDROCODONE-ACETAMINOPHEN  MG TABLET 1 TABLET AS

          NEEDED ORALLY TID PRN PAIN MDD=3

          TAKING LYRICA 75 MG CAPSULE 1 CAPSULE ORALLY TWICE DAILY

          TAKING SKELAXIN 800 MG TABLET 1 TABLET ORALLY THREE TIMES A DAY

          TAKING DICLOFENAC SODIUM 50 MG TABLET DELAYED RELEASE 1 TABLET

          ORALLY THREE TIMES DAILY

          UNKNOWN LIDODERM 5 % PATCH 1-2 PATCH TO SKIN REMOVE AFTER 12

          HOURS EXTERNALLY TO PAIN FULAREAS OF BACK, KNEE/SHOULDER AS

          NEEDED ON 12, OFF 12

          UNKNOWN FISH  MG CAPSULE ORALLY BID

          UNKNOWN BACLOFEN 10 MG TABLET 1 TABLET WITH FOOD OR MILK ORALLY

          THREE TIMES A DAY

          UNKNOWN ARTHROTEC 75-0.2 MG TABLET DELAYED RELEASE 1 TABLET WITH

          FOOD ORALLY TWICE A DAY

          UNKNOWN LISINOPRIL 10 10 MG TABLET ORAL DAILY

          MEDICATION LIST REVIEWED AND RECONCILED WITH THE PATIENT

           

PAST MEDICAL HISTORY

           

          ASTHMA

          SLEEP APNEA, USES CPAP

          HEARING LOSS

          DIABETES TYPE II

          REFLUX

          L KNEE ARTHRITIS

          BACK PAIN

          RIGHT KNEE PAIN

          LEFT ANKLE PAIN

          RIGHT SHOULDER PAIN

          FIBROMYALGIA

           

ALLERGIES

           

          ENVIRONMENTAL: CONGESTION - ALLERGY

           

SOCIAL HISTORY

           

          GENERAL:

           

          TOBACCO USE

          ARE YOU A:NONSMOKER

           

           

          LATEX QUESTIONNAIRE

          LATEX ALLERGY : HAVE YOU EVER DEVELOPED ANY TYPE OF REACTION

          AFTER HANDLING LATEX PRODUCTS SUCH AS RUBBER GLOVES, CONDOMS,

          DIAPHRAGMS, BALLOONS, SOCKS, OR UNDERWEAR?NO

          LATEX ALLERGY : HAVE YOU EVER DEVELOPED ANY TYPE OF REACTION

          DURING OR AFTER DENTAL APPOINTMENT, VAGINAL/RECTAL EXAMINATION,

          SURGICAL PROCEDURE, OR ANY OTHER EXPOSURE?NO

          LATEX RISK : HAVE YOU EVER HAD ANY DIFFICULTY BREATHING OR HIVES

          AFTER EATING OR HANDLING ANY FRUITS, OR VEGETABLES; SUCH AS KIWI,

          BANANAS, STONE FRUITS, OR CHESTNUTSNO

          LATEX RISK : DO YOU HAVE A PREVIOUS PERSONAL HISTORY OF MORE THAN

          NINE SURGERIES, SPINA BIFIDA, OR REPEATED CATHERIZATIONS? NO

          LATEX RISK : ARE YOU FREQUENTLY EXPOSED TO LATEX PRODUCTS IN YOUR

          OCCUPATION?NO

          DATE ASKED : 2021

           

           

          ALCOHOL USE: OCCASION 0-2 DRINKS IN A MONTH.

           

           

          ALCOHOL SCREENING

          DID YOU HAVE A DRINK CONTAINING ALCOHOL IN THE PAST YEAR?YES

          HOW OFTEN DID YOU HAVE A DRINK CONTAINING ALCOHOL IN THE PAST

          YEAR?MONTHLY OR LESS (1 POINT)

          POINTS1

          INTERPRETATIONNEGATIVE

           

           

          RECREATIONAL DRUG USE

          DRUG USE?NO

           

           

          CAFFEINE

          CAFFEINE USE?YES

          HOW OFTEN AND HOW MUCH? 1 CUP PER DAY

           

           

          Anabaptism

          [id number] NONE

           

           

          LANGUAGE

          LANGUAGES SPOKEN:ENGLISH

           

           

          LEARNING BARRIERS / SPECIAL NEEDS

          CHANGE FROM LAST VISIT?NO

          BARRIERS TO LEARNING?NO

          HEARING IMPAIRED?YES TINNITIS, SLIGHT HEARING LOSS BILATERALLY

          VISION IMPAIRED?YES

          :CORRECTIVE LENSES READING

          COGNITIVELY IMPAIRED?NO

          READINESS TO LEARN?YES

          LEARNING PREFERENCES?NO

          LEARNING CAPABILITIES PRESENT?YES

          EMOTIONAL BARRIERS?NO

          SPECIAL DEVICES?YES

          :CANE, OTHER KNEE AND ANKLE BRACE

           NEEDED?NO

           

           

          DOMESTIC VIOLENCE

          DO YOU FEEL SAFE IN YOUR ENVIRONMENT?YES

           

           

          DIET: CARBOHYDRATE CONTROLLED.

           

           

          MARITAL STATUS: .

           

           

          OTHERS AT HOME: CHILD 2 BOYS.

           

           

          -

          PFS REFERRAL NEEDED?NO

          CLERGY REFERRAL NEEDED?NO

          PUBLIC HEALTH REFERRAL NEEDED?NO

          WAS THE PROVIDER NOTIFIED OF ANY PERTINENT INFO? N/A

          HAS THE PATIENT BEEN EDUCATED REGARDING HIS/HER PLAN OF CARE?YES

          HAS THE PATIENT BEEN EDUCATED REGARDING PAIN, THE RISK FOR PAIN,

          THE IMPORTANCE OF EFFECTIVE PAIN MANAGEMENT, AND THE PAIN

          ASSESSMENT PROCESS?YES

           

           

          ADVANCE DIRECTIVE

          ADVANCE DIRECTIVE DISCUSSED WITH PATIENT:YES PT HCP ALVARO TRUJILLO [phone]

           

REVIEW OF SYSTEMS

           

      CONSTITUTIONAL:

           

          ANY RECENT FEVER    NO . CHILLS    NO . WEIGHT CHANGE OF UNKNOWN

          REASONS    NO .

           

      GASTROENTEROLOGY:

           

          NEW UNEXPLAINABLE CHANGES IN BOWEL CONTROL    NO . CONSTIPATION  

           NO .

           

      GENITOURINARY:

           

          ANY NEW CHANGE IN BLADDER CONTROL?    NO .

           

      NEUROLOGY:

           

          NEW ONSET DIZZINESS OR NEUROLOGICAL CHANGES NOT MENTIONED    NO .

          NEW NUMBNESS OR PAIN PATTERNS NOT MENTIONED AND PERTINENT TO

          TODAY'S VISIT    NO .

           

      CARDIOLOGY:

           

          NEW CHEST PRESSURE    NO . PATIENT DENIES    NO .

           

      RESPIRATORY:

           

          UNEXPLAINABLE COUGH    NO . NEW SHORTNESS OF BREATH    NO .

           

VITAL SIGNS

           

          .6 LBS, WT-.2 KG, HT 71 IN, BMI 31.60 INDEX, BP

          111/74 MM HG, HR 87 /MIN, RR 18 /MIN, TEMP 97.7 F, OXYGEN SAT %

          99%, SAFE IN ENV? (Y/N) YES, REVIEWED BY: ABNER ROTH MA.

           

EXAMINATION

           

      GENERAL EXAMINATION:

          GENERALNO ACUTE DISTRESS, WELL NOURISHED AND HYDRATED.

           

          PSYCHAPPROPRIATE MOOD AND AFFECT .

           

          LUNGS:CLEAR TO AUSCULTATION BILATERALLY, NO WHEEZES, RHONCHI,

          RALES.

           

          HEART:NO MURMURS, REGULAR RATE AND RHYTHM.

           

ASSESSMENTS

           

          LUMBAR SPONDYLOSIS - M47.816 (PRIMARY)

           

          LEFT LATERAL KNEE PAIN - M25.562

           

TREATMENT

           

      LEFT LATERAL KNEE PAIN

          REFILL HYDROCODONE-ACETAMINOPHEN TABLET,  MG, 1 TABLET AS

          NEEDED, ORALLY, TID PRN PAIN MDD=3, 30 DAYS, 90

           

           

      OTHERS

          REFILL LYRICA CAPSULE, 150 MG, 1 CAPSULE, ORALLY, TWICE DAILY, 30

          DAY(S), 60

          NOTES: 50-YEAR-OLD MALE IN FOR CHRONIC PAIN FOLLOW-UP. GIVEN

          PRESENTING SYMPTOMS RECOMMEND INCREASING LYRICA  MG 3 TIMES

          A DAY WITH FOLLOW-UP IN 2 MONTHS. PATIENT HAS EXPRESSED

          UNDERSTANDING OF AND WAS IN AGREEMENT WITH TREATMENT PLAN. GIVEN

          TIME TO ASK QUESTIONS AND EXPRESS CONCERNS.

           

          , ISTOP REGISTRY REVIEWED AND DEMONSTRATES COMPLLIANCE. (REF #

          [id number] ) BRINGS IN MEDICATIONS WHICH IS APPROPRIATE FOR WHAT

          WAS DISPENSED. RECENT URINE TOXICOLOGY REVIEWED. NO UNAUTHORIZED

          MEDICATIONS. NO ILLICIT SUBSTANCES AND PRESCRIBED MEDICATIONS

          WERE PRESENT.

           

PROCEDURE CODES

           

           ESTABILISHED PATIENT [facility] CHARGE

           

DISPOSITION & COMMUNICATION

           

FOLLOW UP

           

          2 MONTHS (REASON: BACK AND KNEE PAIN)

           

 

ELECTRONICALLY SIGNED BY VIRGINIE CARDENAS ON

          2021 AT 08:56 AM EDT

           

           

           

 

DISCLAIMER :

THIS IS A VISIT SUMMARY EXTRACTED FROM THE [employer] CHART.

IT IS NOT A COPY OF THE [employer] PROGRESS NOTE.

BRODY

## 2021-05-17 ENCOUNTER — HOSPITAL ENCOUNTER (OUTPATIENT)
Dept: HOSPITAL 53 - M PAIN | Age: 51
End: 2021-05-17
Attending: NURSE PRACTITIONER
Payer: MEDICARE

## 2021-05-17 DIAGNOSIS — Z79.891: ICD-10-CM

## 2021-05-17 DIAGNOSIS — Z79.899: ICD-10-CM

## 2021-05-17 DIAGNOSIS — M79.7: ICD-10-CM

## 2021-05-17 DIAGNOSIS — J45.909: ICD-10-CM

## 2021-05-17 DIAGNOSIS — G89.29: ICD-10-CM

## 2021-05-17 DIAGNOSIS — M25.562: Primary | ICD-10-CM

## 2021-05-17 DIAGNOSIS — E11.9: ICD-10-CM

## 2021-05-17 DIAGNOSIS — G47.30: ICD-10-CM

## 2021-05-19 NOTE — ECWPNPC
PATIENT NAME: TOREY TRUJILLO

: 1970

GENDER: MALE

MRN: [id number]

VISIT DATE: 2021

DISCHARGE DATE: 21

VISIT LOCKED DATE TIME: 

PHYSICIAN: JOSE G BLAND 

RESOURCE: JOSE G BLAND 

 

           

           

REASON FOR APPOINTMENT

           

          1. BACK

           

HISTORY OF PRESENT ILLNESS

           

      DEPRESSION SCREENIN-YEAR-OLD MALE IN FOR

          CHRONIC PAIN FOLLOW-UP. HE FEELS THE MEDICATIONS ARE HELPFUL AND

          DENIES MED SIDE EFFECTS AT THIS TIME.

      PHQ-2 (2015 EDITION)

           

           

          LITTLE INTEREST OR PLEASURE IN DOING THINGS?NOT AT ALL

          FEELING DOWN, DEPRESSED, OR HOPELESS?NOT AT ALL

          TOTAL SCORE0

           

      GENERAL:

           -.

           

      FALL RISK SCREENING:

      SCREENING

          : NO FALLS REPORTED IN THE LAST YEAR.

           

      PAIN SCREENING:

      PATIENT HAS A COMPLAINT OF ACUTE OR CHRONIC PAIN

           

           

          :NO

           

      NURSING NOTE:

           -.

           

      PAIN CENTER INTAKE QUESTIONS:

      DO YOU HAVE A HISTORY OF MRSA?

           

           

          :NO

           

      DO YOU TAKE A BLOOD THINNERS?

           

           

          :NO

           

      DO YOU HAVE ANY BLEEDING DISORDERS?

           

           

          :NO

           

      ANY NEW NUMBNESS OR WEAKNESS IN YOUR LEGS OR ARMS?

           

           

          :NO

           

      ANY PACEMAKER,DEFIBRILLATOR, OR DORSAL COLUMN

          STIMULATOR?

           

           

          :NO

           

      DO YOU HAVE ANY RASHES OR OPEN SORES?

           

           

          :NO

           

      ARE YOU ALLERGIC TO IV DYE?

           

           

          :NO

           

      ARE YOU DIABETIC?

           

           

          :YES TYPE II

           

      ANY NEW PROBLEMS WITH YOUR MEDICATIONS?

           

           

          :NO

           

      HAVE YOU RECEIVED A VACCINE IN THE PAST 30 DAYS?

           

           

          :NO IMMUNOPTHERAPY

           

      DO YOU PLAN TO RECEIVE A VACCINE IN THE NEXT 21

          DAYS?

           

           

          :NO

           

      DO YOU NEED ANY PRESCRIPTION?

           

           

          :YES VICODIN AND DICLOFENAC

           

      DO YOU TAKE ANY IMMUNOSUPPRESSIVE MEDICATIONS?

           

           

          :NO

           

      DO YOU HAVE ANY KIDNEY OR LIVER DISEASE?

           

           

          :NO

           

      IS THERE A CHANCE YOU COULD BE PREGNANT?

           

           

          :NO

           

      ARE YOU BREAST FEEDING?

           

           

          :NO

           

CURRENT MEDICATIONS

           

          TAKING ALBUTEROL SULFATE 2 PUFFS INHALATION EVERY 4 HOURS AS

          NEEDED

          TAKING MULTIVITAMIN 1 TABLET ORALLY DAILY

          TAKING ASCORBIC ACID 600 MG CAPSULE ORALLY DAILY

          TAKING MAY HAVE - - CBD OIL PO AS DIRECTED

          TAKING VITAMIN D 2000 UNIT TABLET ORALLY DAILY

          TAKING ZYRTEC 10 MG 1 TAB ORAL DAILY

          TAKING SKELAXIN 800 MG TABLET 1 TABLET ORALLY THREE TIMES A DAY

          TAKING DICLOFENAC SODIUM 50 MG TABLET DELAYED RELEASE 1 TABLET

          ORALLY THREE TIMES DAILY

          TAKING HYDROCODONE-ACETAMINOPHEN  MG TABLET 1 TABLET AS

          NEEDED ORALLY TID PRN PAIN MDD=3

          TAKING LYRICA 150 MG CAPSULE 1 CAPSULE ORALLY TWICE DAILY

          TAKING LIDODERM 5 % PATCH 1 PATCH REMOVE AFTER 12 HOURS

          EXTERNALLY ONCE A DAY

          TAKING VICODIN 1 TAB ORAL

          UNKNOWN LIDODERM 5 % PATCH 1-2 PATCH TO SKIN REMOVE AFTER 12

          HOURS EXTERNALLY TO PAIN FULAREAS OF BACK, KNEE/SHOULDER AS

          NEEDED ON 12, OFF 12

          UNKNOWN FISH  MG CAPSULE ORALLY BID

          UNKNOWN BACLOFEN 10 MG TABLET 1 TABLET WITH FOOD OR MILK ORALLY

          THREE TIMES A DAY

          UNKNOWN ARTHROTEC 75-0.2 MG TABLET DELAYED RELEASE 1 TABLET WITH

          FOOD ORALLY TWICE A DAY

          UNKNOWN LISINOPRIL 10 10 MG TABLET ORAL DAILY

          MEDICATION LIST REVIEWED AND RECONCILED WITH THE PATIENT

           

PAST MEDICAL HISTORY

           

          ASTHMA

          SLEEP APNEA, USES CPAP

          HEARING LOSS

          DIABETES TYPE II

          REFLUX

          L KNEE ARTHRITIS

          BACK PAIN

          RIGHT KNEE PAIN

          LEFT ANKLE PAIN

          RIGHT SHOULDER PAIN

          FIBROMYALGIA

           

ALLERGIES

           

          ENVIRONMENTAL: CONGESTION - ALLERGY

           

SURGICAL HISTORY

           

          RIGHT SHOULDER RECONSTRUCTION/ ROTATOR CUFF REPAIR 

          LEFT ANKLE RECONSTRUCTION 

          RIGHT KNEE MENISCUS REPAIR 

          UP3 ( ORAL SURGERY) 

          RIGHT SHOULDER REPAIR 

          BONE GRAPHING AND RESECTION ON RIGHT SIDE OF MOUTH 2021

           

SOCIAL HISTORY

           

          GENERAL:

           

          TOBACCO USE

          ARE YOU A:NONSMOKER

           

           

          LATEX QUESTIONNAIRE

          LATEX ALLERGY : HAVE YOU EVER DEVELOPED ANY TYPE OF REACTION

          AFTER HANDLING LATEX PRODUCTS SUCH AS RUBBER GLOVES, CONDOMS,

          DIAPHRAGMS, BALLOONS, SOCKS, OR UNDERWEAR?NO

          LATEX ALLERGY : HAVE YOU EVER DEVELOPED ANY TYPE OF REACTION

          DURING OR AFTER DENTAL APPOINTMENT, VAGINAL/RECTAL EXAMINATION,

          SURGICAL PROCEDURE, OR ANY OTHER EXPOSURE?NO

          LATEX RISK : HAVE YOU EVER HAD ANY DIFFICULTY BREATHING OR HIVES

          AFTER EATING OR HANDLING ANY FRUITS, OR VEGETABLES; SUCH AS KIWI,

          BANANAS, STONE FRUITS, OR CHESTNUTSNO

          LATEX RISK : DO YOU HAVE A PREVIOUS PERSONAL HISTORY OF MORE THAN

          NINE SURGERIES, SPINA BIFIDA, OR REPEATED CATHERIZATIONS? NO

          LATEX RISK : ARE YOU FREQUENTLY EXPOSED TO LATEX PRODUCTS IN YOUR

          OCCUPATION?NO

          DATE ASKED : 2021

           

           

          ALCOHOL USE: OCCASION 0-2 DRINKS IN A MONTH.

           

           

          ALCOHOL SCREENING

          DID YOU HAVE A DRINK CONTAINING ALCOHOL IN THE PAST YEAR?YES

          HOW OFTEN DID YOU HAVE A DRINK CONTAINING ALCOHOL IN THE PAST

          YEAR?MONTHLY OR LESS (1 POINT)

          POINTS1

          INTERPRETATIONNEGATIVE

           

           

          RECREATIONAL DRUG USE

          DRUG USE?NO

           

           

          CAFFEINE

          CAFFEINE USE?YES

          HOW OFTEN AND HOW MUCH? 1 CUP PER DAY

           

           

          Faith

          [id number] NONE

           

           

          LANGUAGE

          LANGUAGES SPOKEN:ENGLISH

           

           

          LEARNING BARRIERS / SPECIAL NEEDS

          CHANGE FROM LAST VISIT?NO

          BARRIERS TO LEARNING?NO

          HEARING IMPAIRED?YES TINNITIS, SLIGHT HEARING LOSS BILATERALLY

          VISION IMPAIRED?YES

          :CORRECTIVE LENSES READING

          COGNITIVELY IMPAIRED?NO

          READINESS TO LEARN?YES

          LEARNING PREFERENCES?NO

          LEARNING CAPABILITIES PRESENT?YES

          EMOTIONAL BARRIERS?NO

          SPECIAL DEVICES?YES

          :CANE, OTHER KNEE AND ANKLE BRACE

           NEEDED?NO

           

           

          DOMESTIC VIOLENCE

          DO YOU FEEL SAFE IN YOUR ENVIRONMENT?YES

           

           

          DIET: CARBOHYDRATE CONTROLLED.

           

           

          MARITAL STATUS: .

           

           

          OTHERS AT HOME: CHILD 2 BOYS.

           

           

          -

          PFS REFERRAL NEEDED?NO

          CLERGY REFERRAL NEEDED?NO

          PUBLIC HEALTH REFERRAL NEEDED?NO

          WAS THE PROVIDER NOTIFIED OF ANY PERTINENT INFO? N/A

          HAS THE PATIENT BEEN EDUCATED REGARDING HIS/HER PLAN OF CARE?YES

          HAS THE PATIENT BEEN EDUCATED REGARDING PAIN, THE RISK FOR PAIN,

          THE IMPORTANCE OF EFFECTIVE PAIN MANAGEMENT, AND THE PAIN

          ASSESSMENT PROCESS?YES

           

           

          ADVANCE DIRECTIVE

          ADVANCE DIRECTIVE DISCUSSED WITH PATIENT:YES PT HCP ALVARO TRUJILLO [phone]

           

HOSPITALIZATION/MAJOR DIAGNOSTIC PROCEDURE

           

          KETOACIDOSIS 2013

           

REVIEW OF SYSTEMS

           

      CONSTITUTIONAL:

           

          ANY RECENT FEVER    NO, NO . CHILLS    NO, NO . WEIGHT CHANGE OF

          UNKNOWN REASONS    NO, NO .

           

      GASTROENTEROLOGY:

           

          NEW UNEXPLAINABLE CHANGES IN BOWEL CONTROL    NO, NO .

          CONSTIPATION    NO, NO .

           

      GENITOURINARY:

           

          ANY NEW CHANGE IN BLADDER CONTROL?    NO, NO .

           

      NEUROLOGY:

           

          NEW ONSET DIZZINESS OR NEUROLOGICAL CHANGES NOT MENTIONED    NO,

          NO . NEW NUMBNESS OR PAIN PATTERNS NOT MENTIONED AND PERTINENT TO

          TODAY'S VISIT    NO, NO .

           

      CARDIOLOGY:

           

          NEW CHEST PRESSURE    NO, NO . PATIENT DENIES    NO, NO .

           

      RESPIRATORY:

           

          UNEXPLAINABLE COUGH    NO, NO . NEW SHORTNESS OF BREATH    NO, NO

          .

           

VITAL SIGNS

           

          .6 LBS, HT 71 IN, BMI 30.49 INDEX, /80 MM HG, HR 85

          /MIN, RR 18 /MIN, TEMP 97.7 F, OXYGEN SAT % 98%, SAFE IN ENV?

          (Y/N) YES, NA INITIALS SC 09:03, REVIEWED BY: ABNER ROTH MA.

           

EXAMINATION

           

      GENERAL EXAMINATION:

          GENERALNO ACUTE DISTRESS, WELL NOURISHED AND HYDRATED.

           

          PSYCHAPPROPRIATE MOOD AND AFFECT .

           

          LUNGS:CLEAR TO AUSCULTATION BILATERALLY, NO WHEEZES, RHONCHI,

          RALES.

           

          HEART:NO MURMURS, REGULAR RATE AND RHYTHM.

           

ASSESSMENTS

           

          LEFT LATERAL KNEE PAIN - M25.562 (PRIMARY)

           

TREATMENT

           

      LEFT LATERAL KNEE PAIN

          REFILL HYDROCODONE-ACETAMINOPHEN TABLET,  MG, 1 TABLET AS

          NEEDED, ORALLY, TID PRN PAIN MDD=3, 30 DAYS, 90

          NOTES: 50-YEAR-OLD MALE IN FOR CHRONIC PAIN FOLLOW-UP. GIVEN

          PRESENTING SYMPTOMS RECOMMENDED CONTINUATION OF CURRENT

          MEDICATION REGIMEN WITH FOLLOW-UP IN 3 MONTHS. PATIENT HAS

          EXPRESSED UNDERSTANDING OF AND WAS IN AGREEMENT WITH TREATMENT

          PLAN. GIVEN TIME TO ASK QUESTIONS AND EXPRESS CONCERNS.

           

          ISTOP REGISTRY REVIEWED AND DEMONSTRATES COMPLLIANCE. (REF

          #[id number] ) BRINGS IN MEDICATIONS WHICH IS APPROPRIATE FOR WHAT

          WAS DISPENSED. RECENT URINE TOXICOLOGY REVIEWED. NO UNAUTHORIZED

          MEDICATIONS. NO ILLICIT SUBSTANCES AND PRESCRIBED MEDICATIONS

          WERE PRESENT.

           

           

      OTHERS

          REFILL DICLOFENAC SODIUM TABLET DELAYED RELEASE, 50 MG, 1 TABLET,

          ORALLY, THREE TIMES DAILY, 30 DAY(S), 90, REFILLS 2

          REFILL LIDODERM PATCH, 5 %, 1 PATCH REMOVE AFTER 12 HOURS,

          EXTERNALLY, ONCE A DAY, 30 DAYS, 30

           

PROCEDURE CODES

           

           ESTABILISHED PATIENT [facility] CHARGE

           

DISPOSITION & COMMUNICATION

           

FOLLOW UP

           

          3 MONTHS (REASON: BACK PAIN)

           

 

ELECTRONICALLY SIGNED BY VIRGINIE CARDENAS ON

          2021 AT 01:15 PM EDT

           

           

           

 

DISCLAIMER :

THIS IS A VISIT SUMMARY EXTRACTED FROM THE [employer] CHART.

IT IS NOT A COPY OF THE [employer] PROGRESS NOTE.

BRODY

## 2021-08-03 ENCOUNTER — HOSPITAL ENCOUNTER (OUTPATIENT)
Dept: HOSPITAL 53 - M PAIN | Age: 51
End: 2021-08-03
Attending: NURSE PRACTITIONER
Payer: MEDICARE

## 2021-08-03 DIAGNOSIS — Z79.891: ICD-10-CM

## 2021-08-03 DIAGNOSIS — M54.16: Primary | ICD-10-CM

## 2021-08-03 DIAGNOSIS — G47.30: ICD-10-CM

## 2021-08-03 DIAGNOSIS — M79.7: ICD-10-CM

## 2021-08-03 DIAGNOSIS — H91.90: ICD-10-CM

## 2021-08-03 DIAGNOSIS — E11.9: ICD-10-CM

## 2021-08-03 DIAGNOSIS — M17.12: ICD-10-CM

## 2021-08-03 DIAGNOSIS — K21.9: ICD-10-CM

## 2021-08-03 DIAGNOSIS — Z79.899: ICD-10-CM

## 2021-08-03 DIAGNOSIS — J45.909: ICD-10-CM

## 2021-08-05 NOTE — ECWPNPC
PATIENT NAME: TOREY TRUJILLO

: 1970

GENDER: MALE

MRN: [id number]

VISIT DATE: 2021

DISCHARGE DATE: 21

VISIT LOCKED DATE TIME: 

PHYSICIAN: JOSE G BLAND 

RESOURCE: JOSE G BLAND 

 

           

           

REASON FOR APPOINTMENT

           

          1. BACK

           

HISTORY OF PRESENT ILLNESS

           

      GENERAL:

      HPI

          50-YEAR-OLD MALE IN FOR CHRONIC PAIN FOLLOW-UP. PATIENT ADMITS TO

          INCREASED RADICULAR SYMPTOMS DOWN HIS LEFT LEG. HE RATES HIS PAIN

          CURRENTLY AT A 7 OUT OF 10 AND DESCRIBES IT AS CONTINUOUS, SHARP,

          AND SHOOTING. HE FEELS HIS MEDICATIONS ARE HELPFUL AND DENIES MED

          SIDE EFFECTS AT THIS TIME..

           

           - -.

           

      FALL RISK SCREENING:

      SCREENING

          : NO FALLS REPORTED IN THE LAST YEAR.

           

      PAIN SCREENING:

      PATIENT HAS A COMPLAINT OF ACUTE OR CHRONIC PAIN

           

           

          :YES

          LOCATION OF PAIN:LOW BACK

          INTENSITY OF PAIN (SCALE OF 1 TO 10):7

          WHAT DOES YOUR PAIN FEEL LIKE:CONTINOUS, SHARP, SHOOTING

          DURATION:CONTINOUS, CONSTANT, AWAKENS FROM SLEEP

          PAIN IS INCREASED BY:ACTIVITIES, PROLONGED STANDING

          PAIN IS DECREASED BY:USE OF PAIN MEDICATIONS, SITTING HOT TUB

           

      NURSING NOTE:

           - -.

           

      PAIN CENTER INTAKE QUESTIONS:

      DO YOU HAVE A HISTORY OF MRSA?

           

           

          :NO

           

      DO YOU TAKE A BLOOD THINNERS?

           

           

          :NO

           

      DO YOU HAVE ANY BLEEDING DISORDERS?

           

           

          :NO

           

      ANY NEW NUMBNESS OR WEAKNESS IN YOUR LEGS OR ARMS?

           

           

          :YES NEW SHOOTING PAIN DOWN LEFT LEG

           

      ANY PACEMAKER,DEFIBRILLATOR, OR DORSAL COLUMN

          STIMULATOR?

           

           

          :NO

           

      DO YOU HAVE ANY RASHES OR OPEN SORES?

           

           

          :NO

           

      ARE YOU ALLERGIC TO IV DYE?

           

           

          :NO

           

      ARE YOU DIABETIC?

           

           

          :YES TYPE II

           

      ANY NEW PROBLEMS WITH YOUR MEDICATIONS?

           

           

          :NO

           

      HAVE YOU RECEIVED A VACCINE IN THE PAST 30 DAYS?

           

           

          :NO IMMUNOTHERAPY

           

      DO YOU PLAN TO RECEIVE A VACCINE IN THE NEXT 21

          DAYS?

           

           

          :NO

           

      DO YOU NEED ANY PRESCRIPTION?

           

           

          :YES ALL

           

      DO YOU TAKE ANY IMMUNOSUPPRESSIVE MEDICATIONS?

           

           

          :NO

           

      DO YOU HAVE ANY KIDNEY OR LIVER DISEASE?

           

           

          :NO

           

      IS THERE A CHANCE YOU COULD BE PREGNANT?

           

           

          :NO

           

      ARE YOU BREAST FEEDING?

           

           

          :NO

           

CURRENT MEDICATIONS

           

          TAKING VITAMIN D (ERGOCALCIFEROL) 1.25 MG (31856 UT) CAPSULE 1

          CAPSULE ORALLY WEEKLY

          TAKING ALBUTEROL SULFATE 2 PUFFS INHALATION EVERY 4 HOURS AS

          NEEDED

          TAKING MULTIVITAMIN 1 TABLET ORALLY DAILY

          TAKING ASCORBIC ACID 600 MG CAPSULE ORALLY DAILY

          TAKING MAY HAVE - - CBD OIL PO AS DIRECTED

          TAKING VITAMIN D 50 MCG (2000 UT) TABLET 1 TABLET ORALLY DAILY

          TAKING ZYRTEC 10 MG 1 TAB ORAL DAILY

          TAKING VICODIN 1 TAB ORAL

          TAKING DICLOFENAC SODIUM 50 MG TABLET DELAYED RELEASE 1 TABLET

          ORALLY THREE TIMES DAILY

          TAKING LIDODERM 5 % PATCH 1 PATCH REMOVE AFTER 12 HOURS

          EXTERNALLY ONCE A DAY

          TAKING HYDROCODONE-ACETAMINOPHEN  MG TABLET 1 TABLET AS

          NEEDED ORALLY TID PRN PAIN MDD=3

          TAKING LYRICA 150 MG CAPSULE 1 CAPSULE ORALLY TWICE DAILY

          NOT-TAKING SKELAXIN 800 MG TABLET 1 TABLET ORALLY THREE TIMES A

          DAY

          UNKNOWN LIDODERM 5 % PATCH 1-2 PATCH TO SKIN REMOVE AFTER 12

          HOURS EXTERNALLY TO PAIN FULAREAS OF BACK, KNEE/SHOULDER AS

          NEEDED ON 12, OFF 12

          UNKNOWN FISH  MG CAPSULE ORALLY BID

          UNKNOWN BACLOFEN 10 MG TABLET 1 TABLET WITH FOOD OR MILK ORALLY

          THREE TIMES A DAY

          UNKNOWN ARTHROTEC 75-0.2 MG TABLET DELAYED RELEASE 1 TABLET WITH

          FOOD ORALLY TWICE A DAY

          UNKNOWN LISINOPRIL 10 10 MG TABLET ORAL DAILY

          MEDICATION LIST REVIEWED AND RECONCILED WITH THE PATIENT

           

PAST MEDICAL HISTORY

           

          ASTHMA

          SLEEP APNEA, USES CPAP

          HEARING LOSS

          DIABETES TYPE II

          REFLUX

          L KNEE ARTHRITIS

          BACK PAIN

          RIGHT KNEE PAIN

          LEFT ANKLE PAIN

          RIGHT SHOULDER PAIN

          FIBROMYALGIA

           

ALLERGIES

           

          ENVIRONMENTAL: CONGESTION - ALLERGY

           

SOCIAL HISTORY

           

          GENERAL:

           

          TOBACCO USE

          ARE YOU A:NONSMOKER

           

           

          LATEX QUESTIONNAIRE

          LATEX ALLERGY : HAVE YOU EVER DEVELOPED ANY TYPE OF REACTION

          AFTER HANDLING LATEX PRODUCTS SUCH AS RUBBER GLOVES, CONDOMS,

          DIAPHRAGMS, BALLOONS, SOCKS, OR UNDERWEAR?NO

          LATEX ALLERGY : HAVE YOU EVER DEVELOPED ANY TYPE OF REACTION

          DURING OR AFTER DENTAL APPOINTMENT, VAGINAL/RECTAL EXAMINATION,

          SURGICAL PROCEDURE, OR ANY OTHER EXPOSURE?NO

          LATEX RISK : HAVE YOU EVER HAD ANY DIFFICULTY BREATHING OR HIVES

          AFTER EATING OR HANDLING ANY FRUITS, OR VEGETABLES; SUCH AS KIWI,

          BANANAS, STONE FRUITS, OR CHESTNUTSNO

          LATEX RISK : DO YOU HAVE A PREVIOUS PERSONAL HISTORY OF MORE THAN

          NINE SURGERIES, SPINA BIFIDA, OR REPEATED CATHERIZATIONS? NO

          LATEX RISK : ARE YOU FREQUENTLY EXPOSED TO LATEX PRODUCTS IN YOUR

          OCCUPATION?NO

          DATE ASKED : 2021

           

           

          ALCOHOL USE: OCCASION 0-2 DRINKS IN A MONTH.

           

           

          ALCOHOL SCREENING

          DID YOU HAVE A DRINK CONTAINING ALCOHOL IN THE PAST YEAR?YES

          HOW OFTEN DID YOU HAVE A DRINK CONTAINING ALCOHOL IN THE PAST

          YEAR?MONTHLY OR LESS (1 POINT)

          POINTS1

          INTERPRETATIONNEGATIVE

           

           

          RECREATIONAL DRUG USE

          DRUG USE?NO

           

           

          CAFFEINE

          CAFFEINE USE?YES

          HOW OFTEN AND HOW MUCH? 1 CUP PER DAY

           

           

          Mormon

          [id number] NONE

           

           

          LANGUAGE

          LANGUAGES SPOKEN:ENGLISH

           

           

          LEARNING BARRIERS / SPECIAL NEEDS

          CHANGE FROM LAST VISIT?NO

          BARRIERS TO LEARNING?NO

          HEARING IMPAIRED?YES TINNITIS, SLIGHT HEARING LOSS BILATERALLY

          VISION IMPAIRED?YES

          :CORRECTIVE LENSES READING

          COGNITIVELY IMPAIRED?NO

          READINESS TO LEARN?YES

          LEARNING PREFERENCES?NO

          LEARNING CAPABILITIES PRESENT?YES

          EMOTIONAL BARRIERS?NO

          SPECIAL DEVICES?YES

          :CANE, OTHER KNEE AND ANKLE BRACE

           NEEDED?NO

           

           

          DOMESTIC VIOLENCE

          DO YOU FEEL SAFE IN YOUR ENVIRONMENT?YES

           

           

          DIET: CARBOHYDRATE CONTROLLED.

           

           

          MARITAL STATUS: .

           

           

          OTHERS AT HOME: CHILD 2 BOYS.

           

           

          -

          PFS REFERRAL NEEDED?NO

          CLERGY REFERRAL NEEDED?NO

          PUBLIC HEALTH REFERRAL NEEDED?NO

          WAS THE PROVIDER NOTIFIED OF ANY PERTINENT INFO? N/A

          HAS THE PATIENT BEEN EDUCATED REGARDING HIS/HER PLAN OF CARE?YES

          HAS THE PATIENT BEEN EDUCATED REGARDING PAIN, THE RISK FOR PAIN,

          THE IMPORTANCE OF EFFECTIVE PAIN MANAGEMENT, AND THE PAIN

          ASSESSMENT PROCESS?YES

           

           

          ADVANCE DIRECTIVE

          ADVANCE DIRECTIVE DISCUSSED WITH PATIENT:YES PT HCP ALVARO TRUJILLO [phone]

           

REVIEW OF SYSTEMS

           

      CONSTITUTIONAL:

           

          ANY RECENT FEVER    NO . CHILLS    NO . WEIGHT CHANGE OF UNKNOWN

          REASONS    NO .

           

      GASTROENTEROLOGY:

           

          NEW UNEXPLAINABLE CHANGES IN BOWEL CONTROL    NO . CONSTIPATION  

           NO .

           

      GENITOURINARY:

           

          ANY NEW CHANGE IN BLADDER CONTROL?    NO .

           

      NEUROLOGY:

           

          NEW ONSET DIZZINESS OR NEUROLOGICAL CHANGES NOT MENTIONED    NO .

          NEW NUMBNESS OR PAIN PATTERNS NOT MENTIONED AND PERTINENT TO

          TODAY'S VISIT    NO .

           

      CARDIOLOGY:

           

          NEW CHEST PRESSURE    NO . PATIENT DENIES    NO .

           

      RESPIRATORY:

           

          UNEXPLAINABLE COUGH    NO . NEW SHORTNESS OF BREATH    NO .

           

VITAL SIGNS

           

          .8 LBS, WT-.52 KG, HT 71 IN, BMI 31.21 INDEX, BP

          139/82 MM HG, HR 67 /MIN, RR 18 /MIN, TEMP 98.2 F, OXYGEN SAT %

          100%, SAFE IN ENV? (Y/N) YES, NA INITIALS AW 0857, REVIEWED BY:

          ABNER ROTH MA.

           

EXAMINATION

           

      GENERAL EXAMINATION:

          GENERALNO ACUTE DISTRESS, WELL NOURISHED AND HYDRATED.

           

          PSYCHAPPROPRIATE MOOD AND AFFECT .

           

          LUNGS:CLEAR TO AUSCULTATION BILATERALLY, NO WHEEZES, RHONCHI,

          RALES.

           

          HEART:NO MURMURS, REGULAR RATE AND RHYTHM.

           

ASSESSMENTS

           

          LUMBAR RADICULOPATHY - M54.16

           

TREATMENT

           

      OTHERS

          NOTES: 50-YEAR-OLD MALE IN FOR CHRONIC PAIN FOLLOW-UP. GIVEN

          PRESENTING SYMPTOMS RECOMMEND GETTING AN UPDATED MRI OF THE

          LUMBAR SPINE WITH FOLLOW-UP POST IMAGING. PATIENT IS EXPRESSED

          UNDERSTANDING OF AND WAS IN AGREEMENT WITH TREATMENT PLAN. GIVEN

          TIME TO ASK QUESTIONS AND EXPRESS CONCERNS.

           

          [facility] REGISTRY REVIEWED AND DEMONSTRATES COMPLLIANCE. (REF #

          [id number] ) BRINGS IN MEDICATIONS WHICH IS APPROPRIATE FOR WHAT

          WAS DISPENSED. RECENT URINE TOXICOLOGY REVIEWED. NO UNAUTHORIZED

          MEDICATIONS. NO ILLICIT SUBSTANCES AND PRESCRIBED MEDICATIONS

          WERE PRESENT.

           

DIAGNOSTIC IMAGING

           

          [facility] MRI SPINE[facility] WITHOUT [id number]

           

PROCEDURE CODES

           

           ESTABILISHED PATIENT [facility] FACILITY CHARGE

           

DISPOSITION & COMMUNICATION

           

FOLLOW UP

           

          POST IMAGING (REASON: MRI OF THE LUMBAR SPINE WITHOUT CONTRAST )

           

 

ELECTRONICALLY SIGNED BY VIRGINIE CARDENAS ON

          2021 AT 08:17 AM EDT

           

           

           

 

DISCLAIMER :

THIS IS A VISIT SUMMARY EXTRACTED FROM THE [employer] CHART.

IT IS NOT A COPY OF THE [employer] PROGRESS NOTE.

BRODY

## 2021-08-17 ENCOUNTER — HOSPITAL ENCOUNTER (OUTPATIENT)
Dept: HOSPITAL 53 - M RAD | Age: 51
End: 2021-08-17
Attending: NURSE PRACTITIONER
Payer: MEDICARE

## 2021-08-17 DIAGNOSIS — M54.16: Primary | ICD-10-CM

## 2021-08-17 NOTE — REPVR
PROCEDURE INFORMATION: 

Exam: MR Lumbar Spine Without Contrast 

Exam date and time: 8/17/2021 8:28 AM 

Age: 50 years old 

Clinical indication: Low back pain; Additional info: Radiculopathy 



TECHNIQUE: 

Imaging protocol: Multiplanar magnetic resonance images of the lumbar spine 

without intravenous contrast. 



COMPARISON: 

No relevant prior studies available. 



FINDINGS: 

Vertebrae: Evaluation of the marrow demonstrates no evidence of acute fracture 

line, high-grade compression deformity, worrisome malalignment, or marrow. Loss 

of lordosis suggests spasm. Moderate posterior element hypertrophic changes at 

multiple levels. Loss of lordosis suggests spasm. No significant Modic type 

changes in the endplates. Benign marrow signal on the T1 weighted images. 

Spinal cord: Conus terminates at L1-L2 without abnormal cord signal and I see 

no evidence of arachnoiditis or epidural fluid. 

L1-L2:  No significant disc disease. No significant spinal canal stenosis. No 

neural foraminal stenosis. 

L2-L3:  No significant disc disease. No significant spinal canal stenosis. No 

neural foraminal stenosis. 

L3-L4: Minimal bulging of the disc at L3-L4 with minimal left and mild right 

foraminal encroachment. No significant central stenosis though there may be 

contact of the traversing L4 nerve roots bilaterally. 

L4-L5: At L4-L5, broad-based bulging disc is present with mild central stenosis 

along mild right and mild-to-moderate left foraminal encroachment. Minimal 

fluid in the facet joints bilaterally at this level. 

L5-S1:  At L5-S1 broadly bulging disc is present not contacting the traversing 

S1 nerve roots. Mild to moderate central stenosis with moderate left and 

moderate to severe right foraminal encroachment. Fluid in the facet joints at 

this level bilaterally. 

Sacrum/coccyx: Symmetric SI joints. 

Soft tissues: No paraspinal mass or hematoma. 



Disc spaces: Disc desiccation at multiple levels with relative preservation of 

the disc spaces. 



IMPRESSION: 

Degenerative changes and disc abnormalities at multiple levels with mass effect 

most pronounced at L5-S1.



Electronically signed by: Luis Alberto Valencia On 08/17/2021  08:40:50 AM

## 2021-11-09 ENCOUNTER — HOSPITAL ENCOUNTER (OUTPATIENT)
Dept: HOSPITAL 53 - M PAIN | Age: 51
End: 2021-11-09
Attending: ANESTHESIOLOGY
Payer: MEDICARE

## 2021-11-09 DIAGNOSIS — Z79.84: ICD-10-CM

## 2021-11-09 DIAGNOSIS — M51.16: Primary | ICD-10-CM

## 2021-11-09 DIAGNOSIS — K21.9: ICD-10-CM

## 2021-11-09 DIAGNOSIS — M79.7: ICD-10-CM

## 2021-11-09 DIAGNOSIS — G47.30: ICD-10-CM

## 2021-11-09 DIAGNOSIS — M17.12: ICD-10-CM

## 2021-11-09 DIAGNOSIS — E11.9: ICD-10-CM

## 2021-11-09 DIAGNOSIS — Z79.899: ICD-10-CM

## 2021-11-09 DIAGNOSIS — J45.909: ICD-10-CM

## 2021-12-09 ENCOUNTER — HOSPITAL ENCOUNTER (OUTPATIENT)
Dept: HOSPITAL 53 - M LABSMTC | Age: 51
End: 2021-12-09
Attending: ANESTHESIOLOGY
Payer: MEDICARE

## 2021-12-09 DIAGNOSIS — Z01.812: Primary | ICD-10-CM

## 2021-12-09 DIAGNOSIS — Z11.52: ICD-10-CM

## 2021-12-14 ENCOUNTER — HOSPITAL ENCOUNTER (OUTPATIENT)
Dept: HOSPITAL 53 - M PAIN | Age: 51
End: 2021-12-14
Attending: ANESTHESIOLOGY
Payer: MEDICARE

## 2021-12-14 DIAGNOSIS — J45.909: ICD-10-CM

## 2021-12-14 DIAGNOSIS — Z79.899: ICD-10-CM

## 2021-12-14 DIAGNOSIS — M51.16: Primary | ICD-10-CM

## 2021-12-14 DIAGNOSIS — M79.7: ICD-10-CM

## 2021-12-14 DIAGNOSIS — G47.30: ICD-10-CM

## 2021-12-14 PROCEDURE — 62323 NJX INTERLAMINAR LMBR/SAC: CPT

## 2021-12-14 NOTE — REP
INDICATION:

LUMBAR EPIDURAL STEROID INJECTION.



COMPARISON:

None.



TECHNIQUE:

Single view C-arm imaging.  9.1 seconds of fluoroscopy time was reported.



FINDINGS:

Single view C-arm imaging revealing lower lumbar anatomy.  The imaging shows lower

lumbar anatomy, needle position, and contrast associated with injection procedure.



IMPRESSION:

Procedural imaging.



<Electronically signed by Deloris Doss > 12/14/21 [id number]

<Electronically signed by Zaheer Plata > 12/14/21 [id number]

## 2022-01-06 ENCOUNTER — HOSPITAL ENCOUNTER (OUTPATIENT)
Dept: HOSPITAL 53 - M PAIN | Age: 52
End: 2022-01-06
Attending: FAMILY MEDICINE
Payer: MEDICARE

## 2022-01-06 DIAGNOSIS — J45.909: ICD-10-CM

## 2022-01-06 DIAGNOSIS — M51.16: ICD-10-CM

## 2022-01-06 DIAGNOSIS — G47.30: ICD-10-CM

## 2022-01-06 DIAGNOSIS — E11.9: ICD-10-CM

## 2022-01-06 DIAGNOSIS — Z79.891: ICD-10-CM

## 2022-01-06 DIAGNOSIS — M25.572: ICD-10-CM

## 2022-01-06 DIAGNOSIS — G89.29: Primary | ICD-10-CM

## 2022-01-06 DIAGNOSIS — Z79.899: ICD-10-CM

## 2022-01-06 DIAGNOSIS — M25.511: ICD-10-CM

## 2022-01-06 DIAGNOSIS — M79.7: ICD-10-CM

## 2022-01-06 DIAGNOSIS — H91.93: ICD-10-CM

## 2022-03-03 ENCOUNTER — HOSPITAL ENCOUNTER (OUTPATIENT)
Dept: HOSPITAL 53 - M PAIN | Age: 52
End: 2022-03-03
Attending: FAMILY MEDICINE
Payer: MEDICARE

## 2022-03-03 DIAGNOSIS — K21.9: ICD-10-CM

## 2022-03-03 DIAGNOSIS — M79.7: ICD-10-CM

## 2022-03-03 DIAGNOSIS — M51.16: Primary | ICD-10-CM

## 2022-03-03 DIAGNOSIS — J45.909: ICD-10-CM

## 2022-03-03 DIAGNOSIS — G47.30: ICD-10-CM

## 2022-03-03 DIAGNOSIS — Z79.891: ICD-10-CM

## 2022-03-03 DIAGNOSIS — E11.9: ICD-10-CM

## 2022-03-03 DIAGNOSIS — Z79.899: ICD-10-CM

## 2022-03-03 DIAGNOSIS — H91.90: ICD-10-CM
